# Patient Record
Sex: FEMALE | Race: WHITE | NOT HISPANIC OR LATINO | Employment: OTHER | URBAN - METROPOLITAN AREA
[De-identification: names, ages, dates, MRNs, and addresses within clinical notes are randomized per-mention and may not be internally consistent; named-entity substitution may affect disease eponyms.]

---

## 2017-03-02 ENCOUNTER — OFFICE VISIT (OUTPATIENT)
Dept: FAMILY MEDICINE | Facility: CLINIC | Age: 62
End: 2017-03-02

## 2017-03-02 VITALS
SYSTOLIC BLOOD PRESSURE: 130 MMHG | WEIGHT: 210.2 LBS | DIASTOLIC BLOOD PRESSURE: 88 MMHG | TEMPERATURE: 98.4 F | HEART RATE: 77 BPM | BODY MASS INDEX: 31.96 KG/M2 | OXYGEN SATURATION: 98 %

## 2017-03-02 DIAGNOSIS — G43.709 CHRONIC MIGRAINE WITHOUT AURA WITHOUT STATUS MIGRAINOSUS, NOT INTRACTABLE: ICD-10-CM

## 2017-03-02 DIAGNOSIS — J01.01 ACUTE RECURRENT MAXILLARY SINUSITIS: Primary | ICD-10-CM

## 2017-03-02 PROCEDURE — 99213 OFFICE O/P EST LOW 20 MIN: CPT | Performed by: PHYSICIAN ASSISTANT

## 2017-03-02 RX ORDER — KETOROLAC TROMETHAMINE 30 MG/ML
60 INJECTION, SOLUTION INTRAMUSCULAR; INTRAVENOUS ONCE
Qty: 2 ML | Refills: 0 | OUTPATIENT
Start: 2017-03-02 | End: 2017-03-02

## 2017-03-02 RX ORDER — BENZONATATE 100 MG/1
100 CAPSULE ORAL 3 TIMES DAILY PRN
Qty: 30 CAPSULE | Refills: 0 | Status: SHIPPED | OUTPATIENT
Start: 2017-03-02 | End: 2017-06-06

## 2017-03-02 NOTE — MR AVS SNAPSHOT
After Visit Summary   3/2/2017    Evelyn Medrano    MRN: [id number]           Patient Information     Date Of Birth          1955        Visit Information        Provider Department      3/2/2017 6:00 PM Rosita Arriola PA-C [facility]        Today's Diagnoses     Acute recurrent maxillary sinusitis    -  1    Chronic migraine without aura without status migrainosus, not intractable           Follow-ups after your visit        Follow-up notes from your care team     Return if symptoms worsen or fail to improve.      Who to contact     If you have questions or need follow up information about today's clinic visit or your schedule please contact [facility] directly at [phone].  Normal or non-critical lab and imaging results will be communicated to you by [employer], letter or phone within 4 business days after the clinic has received the results. If you do not hear from us within 7 days, please contact the clinic through [employer] or phone. If you have a critical or abnormal lab result, we will notify you by phone as soon as possible.  Submit refill requests through [employer] or call your pharmacy and they will forward the refill request to us. Please allow 3 business days for your refill to be completed.          Additional Information About Your Visit        [employer] Information     [employer] gives you secure access to your electronic health record. If you see a primary care provider, you can also send messages to your care team and make appointments. If you have questions, please call your primary care clinic.  If you do not have a primary care provider, please call [phone] and they will assist you.        Care EveryWhere ID     This is your Care EveryWhere ID. This could be used by other organizations to access your [facility] medical records  [id number]        Your Vitals Were     Pulse Temperature Last Period Pulse Oximetry BMI (Body  Mass Index)       77 98.4  F (36.9  C) (Oral) 02/15/2003 98% 31.96 kg/m2        Blood Pressure from Last 3 Encounters:   03/02/17 130/88   11/28/16 (!) 142/92   10/03/16 120/82    Weight from Last 3 Encounters:   03/02/17 95.3 kg (210 lb 3.2 oz)   11/28/16 94.7 kg (208 lb 12.8 oz)   10/03/16 91.6 kg (202 lb)              Today, you had the following     No orders found for display         Today's Medication Changes          These changes are accurate as of: 3/2/17 10:36 PM.  If you have any questions, ask your nurse or doctor.               Start taking these medicines.        Dose/Directions    amoxicillin-clavulanate 875-125 MG per tablet   Commonly known as:  AUGMENTIN   Used for:  Acute recurrent maxillary sinusitis   Started by:  Rosita Arriola PA-C        Dose:  1 tablet   Take 1 tablet by mouth 2 times daily   Quantity:  20 tablet   Refills:  0       benzonatate 100 MG capsule   Commonly known as:  TESSALON   Used for:  Acute recurrent maxillary sinusitis   Started by:  Rosita Arriola PA-C        Dose:  100 mg   Take 1 capsule (100 mg) by mouth 3 times daily as needed   Quantity:  30 capsule   Refills:  0       ketorolac 60 MG/2ML Soln injection   Commonly known as:  TORADOL   Used for:  Chronic migraine without aura without status migrainosus, not intractable   Started by:  Rosita Arriola PA-C        Dose:  60 mg   Inject 2 mLs (60 mg) into the muscle once for 1 dose   Quantity:  2 mL   Refills:  0            Where to get your medicines      These medications were sent to [facility]/pharmacy #[id number] [place], [place] - [address], [place] [address]     Phone:  [phone]     amoxicillin-clavulanate 875-125 MG per tablet    benzonatate 100 MG capsule         Some of these will need a paper prescription and others can be bought over the counter.  Ask your nurse if you have questions.     You don't need a prescription for these medications     ketorolac 60  MG/2ML Soln injection                Primary Care Provider Office Phone # Fax #    Brandy Leonardo [phone]       [facility] [address] [facility] [address]  [facility] [address]        Thank you!     Thank you for choosing [facility]  for your care. Our goal is always to provide you with excellent care. Hearing back from our patients is one way we can continue to improve our services. Please take a few minutes to complete the written survey that you may receive in the mail after your visit with us. Thank you!             Your Updated Medication List - Protect others around you: Learn how to safely use, store and throw away your medicines at www.disposemymeds.org.          This list is accurate as of: 3/2/17 10:36 PM.  Always use your most recent med list.                   Brand Name Dispense Instructions for use    amoxicillin-clavulanate 875-125 MG per tablet    AUGMENTIN    20 tablet    Take 1 tablet by mouth 2 times daily       benzonatate 100 MG capsule    TESSALON    30 capsule    Take 1 capsule (100 mg) by mouth 3 times daily as needed       fluticasone-vilanterol 100-25 MCG/INH oral inhaler    BREO ELLIPTA    1 Inhaler    Inhale 1 puff into the lungs daily       IMITREX 6 MG/0.5ML injection   Generic drug:  SUMAtriptan      0.5 ML 1 TIME ONLY       ketorolac 60 MG/2ML Soln injection    TORADOL    2 mL    Inject 2 mLs (60 mg) into the muscle once for 1 dose       MULTIVITAMIN PO      1 qd       OMEPRAZOLE PO

## 2017-03-03 NOTE — NURSING NOTE
The following medication was given:     MEDICATION: Ketorolac Tromethamine 60MG/2ML (30 mg/mL) (Toradol)  ROUTE: IM  SITE: LUQ - Gluteus  DOSE:  2 ml  LOT #: -[id number]  :  Avni  EXPIRATION DATE:  04/01/2017  NDC#: 1429-2955-97  Patient waited 20 minutes and did fine

## 2017-03-03 NOTE — PROGRESS NOTES
CC: Sinus    History:  For the last 2 weeks, Evelyn has been having facial pain, drainage, coughing. Cough is producing mucous- no blood. Pain is triggering migraines for which she does Imitrex injections and she already did one today at 12:30 that did not work. Had some chills and body aches, but no fever. No shortness of breath, wheezing. Has been lower energy. Has been staying hydrated.     PMH, MEDICATIONS, ALLERGIES, SOCIAL AND FAMILY HISTORY in [facility] and reviewed by me personally.    ROS negative other than the symptoms noted above in the HPI.      Examination   /88 (BP Location: Right arm, Patient Position: Chair, Cuff Size: Adult Large)  Pulse 77  Temp 98.4  F (36.9  C) (Oral)  Wt 95.3 kg (210 lb 3.2 oz)  LMP 02/15/2003  SpO2 98%  BMI 31.96 kg/m2       Constitutional: Sitting comfortably, in no acute distress. Vital signs noted  Eyes: pupils equal round reactive to light and accomodation, extra ocular movements intact  Ears: external canals and TMs free of abnormalities  Nose: patent, without mucosal abnormalities. Tenderness over maxillary sinuses.  Mouth and throat: without erythema or lesions of the mucosa  Neck:  no adenopathy, trachea midline and normal to palpation  Cardiovascular:  regular rate and rhythm, no murmurs, clicks, or gallops  Respiratory:  no chest deformities noted, normal respiratory rate and rhythm  Psychiatric: mentation appears normal and affect normal/bright        A/P    ICD-10-CM    1. Acute recurrent maxillary sinusitis J01.01 amoxicillin-clavulanate (AUGMENTIN) 875-125 MG per tablet     benzonatate (TESSALON) 100 MG capsule   2. Migraine G43.909        DISCUSSION: Given duration of symptoms, recommended Evelyn complete 10 day course of Augmentin. She should take this twice daily with food, and should consider taking probiotic or eating yogurt in between. Warned her of possible side effects including loose stool.  Provided her with a handout on OTC therapies based on  symptoms.     Given that Imitrex injection was not effective in relieving migraine. Recommended Evelyn do Toradol (ketorolac) injection. Evelyn believes she had this before. No history of kidney or blood disorders. Tolerated well. Remained in clinic 20 minutes prior to leaving with some relief of migraine.     follow up visit: As needed    Rosita Arriola PA-C  [facility]

## 2017-03-03 NOTE — NURSING NOTE
Evelyn is here for sinus and chest congestion    Pre-Visit Screening :  Immunizations : up to date  Colonoscopy : is up to date  Mammogram : is up to date  Asthma Action Test/Plan : LOLA  PHQ9/GAD7 :  NA  Pulse - regular  My Chart - accepts    CLASSIFICATION OF OVERWEIGHT AND OBESITY BY BMI                         Obesity Class           BMI(kg/m2)  Underweight                                    < 18.5  Normal                                         18.5-24.9  Overweight                                     25.0-29.9  OBESITY                     I                  30.0-34.9                              II                 35.0-39.9  EXTREME OBESITY             III                >40                             Patient's  BMI Body mass index is 31.96 kg/(m^2).  http://hin.nhlbi.nih.gov/menuplanner/menu.cgi  Questioned patient about current smoking habits.  Pt. has never smoked.

## 2017-03-07 ENCOUNTER — TELEPHONE (OUTPATIENT)
Dept: FAMILY MEDICINE | Facility: CLINIC | Age: 62
End: 2017-03-07

## 2017-03-07 DIAGNOSIS — J01.01 ACUTE RECURRENT MAXILLARY SINUSITIS: Primary | ICD-10-CM

## 2017-03-07 RX ORDER — LEVOFLOXACIN 500 MG/1
500 TABLET, FILM COATED ORAL DAILY
Qty: 10 TABLET | Refills: 0 | Status: SHIPPED | OUTPATIENT
Start: 2017-03-07 | End: 2017-06-06

## 2017-03-07 NOTE — TELEPHONE ENCOUNTER
pt called the clinical support line with the following;    Pt states that her Augmenten is not working and would like another medication/antibiotic.  Pt is requesting a phone call back.  [place]  [phone] (home)

## 2017-03-07 NOTE — TELEPHONE ENCOUNTER
Still having symptoms, but maybe slightly better. Recommended she take ibuprofen on a schedule, instead of reacting to headache. Gave rx for levaquin to fill in 24-48 hours if still not feeling better. Take with food. Warned of side effects.

## 2017-06-06 ENCOUNTER — OFFICE VISIT (OUTPATIENT)
Dept: FAMILY MEDICINE | Facility: CLINIC | Age: 62
End: 2017-06-06

## 2017-06-06 VITALS
HEIGHT: 67 IN | TEMPERATURE: 98.1 F | SYSTOLIC BLOOD PRESSURE: 136 MMHG | WEIGHT: 209.4 LBS | OXYGEN SATURATION: 97 % | DIASTOLIC BLOOD PRESSURE: 80 MMHG | HEART RATE: 85 BPM | BODY MASS INDEX: 32.87 KG/M2

## 2017-06-06 DIAGNOSIS — L67.9 HAIR CHANGES: Primary | ICD-10-CM

## 2017-06-06 DIAGNOSIS — E66.811 OBESITY (BMI 30.0-34.9): ICD-10-CM

## 2017-06-06 DIAGNOSIS — M25.50 MULTIPLE JOINT PAIN: ICD-10-CM

## 2017-06-06 LAB
ERYTHROCYTE [SEDIMENTATION RATE] IN BLOOD: 9 MM/HR (ref 0–20)
HBA1C MFR BLD: 5.6 % (ref 4–7)

## 2017-06-06 PROCEDURE — 99213 OFFICE O/P EST LOW 20 MIN: CPT | Performed by: PHYSICIAN ASSISTANT

## 2017-06-06 PROCEDURE — 85651 RBC SED RATE NONAUTOMATED: CPT | Performed by: PHYSICIAN ASSISTANT

## 2017-06-06 PROCEDURE — 84443 ASSAY THYROID STIM HORMONE: CPT | Mod: 90 | Performed by: PHYSICIAN ASSISTANT

## 2017-06-06 PROCEDURE — 86039 ANTINUCLEAR ANTIBODIES (ANA): CPT | Mod: 90 | Performed by: PHYSICIAN ASSISTANT

## 2017-06-06 PROCEDURE — 83036 HEMOGLOBIN GLYCOSYLATED A1C: CPT | Performed by: PHYSICIAN ASSISTANT

## 2017-06-06 PROCEDURE — 86431 RHEUMATOID FACTOR QUANT: CPT | Mod: 90 | Performed by: PHYSICIAN ASSISTANT

## 2017-06-06 PROCEDURE — 36415 COLL VENOUS BLD VENIPUNCTURE: CPT | Performed by: PHYSICIAN ASSISTANT

## 2017-06-06 PROCEDURE — 86038 ANTINUCLEAR ANTIBODIES: CPT | Mod: 90 | Performed by: PHYSICIAN ASSISTANT

## 2017-06-06 NOTE — MR AVS SNAPSHOT
"              After Visit Summary   6/6/2017    Evelyn Medrano    MRN: [id number]           Patient Information     Date Of Birth          1955        Visit Information        Provider Department      6/6/2017 12:45 PM Shikha Salazar PA [facility]        Today's Diagnoses     Hair changes    -  1    Obesity (BMI 30.0-34.9)        Multiple joint pain           Follow-ups after your visit        Who to contact     If you have questions or need follow up information about today's clinic visit or your schedule please contact [facility] directly at [phone].  Normal or non-critical lab and imaging results will be communicated to you by MyChart, letter or phone within 4 business days after the clinic has received the results. If you do not hear from us within 7 days, please contact the clinic through [employer] or phone. If you have a critical or abnormal lab result, we will notify you by phone as soon as possible.  Submit refill requests through [employer] or call your pharmacy and they will forward the refill request to us. Please allow 3 business days for your refill to be completed.          Additional Information About Your Visit        MyChart Information     [employer] gives you secure access to your electronic health record. If you see a primary care provider, you can also send messages to your care team and make appointments. If you have questions, please call your primary care clinic.  If you do not have a primary care provider, please call [phone] and they will assist you.        Care EveryWhere ID     This is your Care EveryWhere ID. This could be used by other organizations to access your [facility] medical records  [id number]        Your Vitals Were     Pulse Temperature Height Last Period Pulse Oximetry Breastfeeding?    85 98.1  F (36.7  C) (Oral) 1.689 m (5' 6.5\") 02/15/2003 97% No    BMI (Body Mass Index)                   33.29 kg/m2  "           Blood Pressure from Last 3 Encounters:   06/06/17 136/80   03/02/17 130/88   11/28/16 (!) 142/92    Weight from Last 3 Encounters:   06/06/17 95 kg (209 lb 6.4 oz)   03/02/17 95.3 kg (210 lb 3.2 oz)   11/28/16 94.7 kg (208 lb 12.8 oz)              We Performed the Following     Antinuclear antibody (BRAYDEN) (QUEST)     ESR, WESTERGREN (BFP)     Hemoglobin A1c (BFP)     RHEUMATOID FACTOR (QUEST)     TSH with free T4 reflex (QUEST)     VENOUS COLLECTION        Primary Care Provider Office Phone # Fax #    Brandy Leonardo [phone]       [facility] [address] [facility] [address]  [facility] [address]        Thank you!     Thank you for choosing [facility]  for your care. Our goal is always to provide you with excellent care. Hearing back from our patients is one way we can continue to improve our services. Please take a few minutes to complete the written survey that you may receive in the mail after your visit with us. Thank you!             Your Updated Medication List - Protect others around you: Learn how to safely use, store and throw away your medicines at www.disposemymeds.org.          This list is accurate as of: 6/6/17  2:08 PM.  Always use your most recent med list.                   Brand Name Dispense Instructions for use    IMITREX 6 MG/0.5ML injection   Generic drug:  SUMAtriptan      0.5 ML 1 TIME ONLY       MULTIVITAMIN PO      1 qd

## 2017-06-06 NOTE — NURSING NOTE
Evelyn is here to check thyroid    Pre-Visit Screening :  Immunizations : up to date  Colonoscopy : is up to date  Mammogram : is up to date  Asthma Action Test/Plan : NA  PHQ9/GAD7 :  NA  Pulse - regular  My Chart - accepts    CLASSIFICATION OF OVERWEIGHT AND OBESITY BY BMI                         Obesity Class           BMI(kg/m2)  Underweight                                    < 18.5  Normal                                         18.5-24.9  Overweight                                     25.0-29.9  OBESITY                     I                  30.0-34.9                              II                 35.0-39.9  EXTREME OBESITY             III                >40                             Patient's  BMI Body mass index is 33.29 kg/(m^2).  http://hin.nhlbi.nih.gov/menuplanner/menu.cgi  Questioned patient about current smoking habits.  Pt. has never smoked.

## 2017-06-06 NOTE — PROGRESS NOTES
SUBJECTIVE:                                                    Evelyn Medrano is a 61 year old female who presents to clinic today for the following health issues:      Patient is here with request for thyroid check.    Her hair has become very dry, for the last month - worsening in the last month. Patient does color her hair.  Has googled what could cause the changes to her hair and she would like her thyroid checked.  No new shampoos, coloring etc. Has been using conditioning treatments and it hasn't helped. Has noticed some hair loss.     Has had cough on/off since October.   Non-smoker, no chemical exposures.  Cough comes and goes.   She denies any SOB.     She also states that for > 3 months she has had multiple joint pain in shoulders, knees and hands.  There is no family hx of RA that she knows of, mother did have systemic scleroderma.       ROS:   C: NEGATIVE for fever, chills, change in weight  E: NEGATIVE for vision changes or irritation  E/M: NEGATIVE for ear, mouth and throat problems  R: NEGATIVE for significant cough or SOB  CV: NEGATIVE for chest pain, palpitations or peripheral edema  GI: NEGATIVE for nausea, abdominal pain, heartburn, or change in bowel habits  -does have intermittent constipation.  : NEGATIVE for frequency, dysuria, or hematuria  Musc: has bilateral knee and shoulder pain, intermittent - comes and goes.      Walking 4 days week.         Labs reviewed in EPIC  BP Readings from Last 3 Encounters:   06/06/17 136/80   03/02/17 130/88   11/28/16 (!) 142/92    Wt Readings from Last 3 Encounters:   06/06/17 95 kg (209 lb 6.4 oz)   03/02/17 95.3 kg (210 lb 3.2 oz)   11/28/16 94.7 kg (208 lb 12.8 oz)                  Patient Active Problem List   Diagnosis     Migraine     Diffuse cystic mastopathy     Choroidal Melanoma     Anxiety state     ACP (advance care planning)     Microscopic hematuria     Health Care Home     Obesity (BMI 30.0-34.9)     GERD (gastroesophageal reflux  disease)     Vitamin D deficiency     Past Surgical History:   Procedure Laterality Date     C ANESTH,SURGERY OF SHOULDER      frozen shoulder     C APPENDECTOMY       C ASHLEE RETINAL VERONICARADNITHYA IMPLNT      [place], followed at [place], also [facility]ATON TOTAL KNEE ARTHROPLASTY      meniscus tear     COLONOSCOPY      Normal repeat in 5 years     COLONOSCOPY  2013    internal hemorrhoids; repeat in 10 years     DESTRUC RETINAL VERONICA,PHOTOCOAG      right eye/ melanoma treatment     HC TOOTH EXTRACTION W/FORCEP  age 25     UPPER GI ENDOSCOPY  2011    normal       Social History   Substance Use Topics     Smoking status: Never Smoker     Smokeless tobacco: Never Used     Alcohol use 1.0 oz/week     2 Standard drinks or equivalent per week      Comment: rarely     Family History   Problem Relation Age of Onset     Neurologic Disorder Mother      migraines     Arthritis Mother 33     scleroderma - systemic     Neurologic Disorder Father       from MVA     DIABETES Brother      DIABETES Sister          Current Outpatient Prescriptions   Medication Sig Dispense Refill     IMITREX 6 MG/0.5ML SC SOLN 0.5 ML 1 TIME ONLY       MULTIVITAMIN OR 1 qd       Allergies   Allergen Reactions     No Known Allergies      Recent Labs   Lab Test  14   0914  10/11/12   0634 12/09/11   12/01/10   0600  12/01/10   0500   LDL  112   --   124   --    --   119   HDL  72   --   77   --    --   89   TRIG  110   --   72   --    --   78   ALT  24  110*  26   < >   --   23   CR  1.08*  0.90  0.92   < >   --   1.03   GFRESTIMATED  57*  65  71   < >   --   56*   GFRESTBLACK   --   79   --    --    --    --    POTASSIUM  4.5  3.4  3.9   < >   --   4.2   TSH   --    --    --    --   2.60   --     < > = values in this interval not displayed.              OBJECTIVE:   /80 (BP Location: Left arm, Patient Position: Chair, Cuff Size: Adult Large)  Pulse 85  Temp 98.1  F (36.7  C) (Oral)  Ht 1.689 m (5'  "6.5\")  Wt 95 kg (209 lb 6.4 oz)  LMP 02/15/2003  SpO2 97%  Breastfeeding? No  BMI 33.29 kg/m2   Body mass index is 33.29 kg/(m^2).       GENERAL: healthy, alert and no distress  HEAD: Normocephalic, atraumatic  EYES: Eyes grossly normal to inspection, extraocular movements - intact in all directions. No discharge  EARS: canals- normal; TMs- normal  NOSE:  Nasal mucosa pink and moist. No abnormal discharge.  MOUTH:   Mucous membranes moist.  Pharynx non-erythematous, no exudates. No ulcers, no lesions  NECK: no tenderness, no adenopathy,  no masses, no stiffness; thyroid- normal to palpation  RESP: lungs clear to auscultation - no rales, no rhonchi, no wheezes  CV: regular rates and rhythm, normal S1 S2, no S3 or S4 and no murmur, no click or rub   MS: extremities- no gross deformities noted - Full ROM of all extremities.  No [place] or [place] nodes, normal  MTP alignment left and right hands, no radial deviation.   NEURO: strength and tone- normal, sensory exam- grossly normal, mentation- intact, speech- normal  Hair: pull test negative for excessive hair loss.  The hair is dyed, very coarse.           ASSESSMENT/PLAN:                                                    1. Hair changes    - TSH with free T4 reflex (QUEST)  - VENOUS COLLECTION  Derm if TSH nl.    2. Obesity (BMI 30.0-34.9)    - VENOUS COLLECTION  - Hemoglobin A1c (BFP)    3. Multiple joint pain    - ESR, WESTERGREN (BFP)  - Antinuclear antibody (BRAYDEN) (QUEST)  - RHEUMATOID FACTOR (QUEST)  - VENOUS COLLECTION  Advised 1,000 mg APAP tid      BMI:   Estimated body mass index is 33.29 kg/(m^2) as calculated from the following:    Height as of this encounter: 1.689 m (5' 6.5\").    Weight as of this encounter: 95 kg (209 lb 6.4 oz).   Weight management plan: Discussed healthy diet and exercise guidelines and patient will follow up in 12 months in clinic to re-evaluate.              Shikha Salazar PA-C  [facility]    "

## 2017-06-07 LAB
ANA PATTERN - QUEST: ABNORMAL
ANA SCREEN - QUEST: POSITIVE
ANTINUCLEAR ANTIBODIES - QUEST: ABNORMAL TITER
RHEUMATOID FACT SER NEPH-ACNC: 13 IU/ML (ref 0–20)
TSH SERPL-ACNC: 3.76 MIU/L (ref 0.4–4.5)

## 2017-07-20 LAB
HEP C HIM: NORMAL
T4 TOTAL: 6.6 UG/DL (ref 4.5–12)
TSH SERPL-ACNC: 1.83 MCU/ML (ref 0.4–4.5)

## 2017-09-08 ENCOUNTER — HEALTH MAINTENANCE LETTER (OUTPATIENT)
Age: 62
End: 2017-09-08

## 2017-10-19 ENCOUNTER — TRANSFERRED RECORDS (OUTPATIENT)
Dept: FAMILY MEDICINE | Facility: CLINIC | Age: 62
End: 2017-10-19

## 2017-11-29 ENCOUNTER — OFFICE VISIT (OUTPATIENT)
Dept: FAMILY MEDICINE | Facility: CLINIC | Age: 62
End: 2017-11-29

## 2017-11-29 VITALS
TEMPERATURE: 97.5 F | RESPIRATION RATE: 20 BRPM | HEIGHT: 67 IN | HEART RATE: 68 BPM | BODY MASS INDEX: 35.06 KG/M2 | WEIGHT: 223.4 LBS | SYSTOLIC BLOOD PRESSURE: 142 MMHG | DIASTOLIC BLOOD PRESSURE: 70 MMHG

## 2017-11-29 DIAGNOSIS — G43.009 MIGRAINE WITHOUT AURA AND WITHOUT STATUS MIGRAINOSUS, NOT INTRACTABLE: ICD-10-CM

## 2017-11-29 DIAGNOSIS — Z71.89 ACP (ADVANCE CARE PLANNING): ICD-10-CM

## 2017-11-29 DIAGNOSIS — E55.9 VITAMIN D DEFICIENCY: ICD-10-CM

## 2017-11-29 DIAGNOSIS — Z01.818 PRE-OP EXAM: Primary | ICD-10-CM

## 2017-11-29 DIAGNOSIS — E66.9 OBESITY (BMI 30-39.9): ICD-10-CM

## 2017-11-29 DIAGNOSIS — R03.0 ELEVATED BLOOD PRESSURE READING WITHOUT DIAGNOSIS OF HYPERTENSION: ICD-10-CM

## 2017-11-29 DIAGNOSIS — S83.206A TEAR OF MENISCUS OF RIGHT KNEE AS CURRENT INJURY, UNSPECIFIED MENISCUS, UNSPECIFIED TEAR TYPE, INITIAL ENCOUNTER: ICD-10-CM

## 2017-11-29 LAB — HEMOGLOBIN: 14.8 G/DL (ref 11.7–15.7)

## 2017-11-29 PROCEDURE — 99214 OFFICE O/P EST MOD 30 MIN: CPT | Performed by: PHYSICIAN ASSISTANT

## 2017-11-29 PROCEDURE — 93000 ELECTROCARDIOGRAM COMPLETE: CPT | Performed by: PHYSICIAN ASSISTANT

## 2017-11-29 PROCEDURE — 85018 HEMOGLOBIN: CPT | Performed by: PHYSICIAN ASSISTANT

## 2017-11-29 PROCEDURE — 36415 COLL VENOUS BLD VENIPUNCTURE: CPT | Performed by: PHYSICIAN ASSISTANT

## 2017-11-29 NOTE — NURSING NOTE
Evelyn Medrano is here for a pre-op exam.    Questioned patient about current smoking habits.  Pt. has never smoked.  PULSE regular  My Chart: active  CLASSIFICATION OF OVERWEIGHT AND OBESITY BY BMI                        Obesity Class           BMI(kg/m2)  Underweight                                    < 18.5  Normal                                         18.5-24.9  Overweight                                     25.0-29.9  OBESITY                     I                  30.0-34.9                             II                 35.0-39.9  EXTREME OBESITY             III                >40                            Patient's  BMI Body mass index is 35.52 kg/(m^2).  http://hin.nhlbi.nih.gov/menuplanner/menu.cgi  Pre-visit planning  Immunizations - up to date  Colonoscopy - is up to date  Mammogram - is up to date  Asthma -   PHQ9 -    ANGELIA-7 -

## 2017-11-29 NOTE — MR AVS SNAPSHOT
After Visit Summary   11/29/2017    Evelyn Medrano    MRN: [id number]           Patient Information     Date Of Birth          1955        Visit Information        Provider Department      11/29/2017 1:00 PM Shikha Salazar PA [facility]        Today's Diagnoses     Pre-op exam    -  1    ACP (advance care planning)        Tear of meniscus of right knee as current injury, unspecified meniscus, unspecified tear type, initial encounter        Migraine without aura and without status migrainosus, not intractable        Vitamin D deficiency        Obesity (BMI 30-39.9)        Elevated blood pressure reading without diagnosis of hypertension           Follow-ups after your visit        Who to contact     If you have questions or need follow up information about today's clinic visit or your schedule please contact [facility] directly at [phone].  Normal or non-critical lab and imaging results will be communicated to you by MyChart, letter or phone within 4 business days after the clinic has received the results. If you do not hear from us within 7 days, please contact the clinic through [employer] or phone. If you have a critical or abnormal lab result, we will notify you by phone as soon as possible.  Submit refill requests through [facility] or call your pharmacy and they will forward the refill request to us. Please allow 3 business days for your refill to be completed.          Additional Information About Your Visit        MyChart Information     [facility] gives you secure access to your electronic health record. If you see a primary care provider, you can also send messages to your care team and make appointments. If you have questions, please call your primary care clinic.  If you do not have a primary care provider, please call [phone] and they will assist you.        Care EveryWhere ID     This is your Care EveryWhere ID. This  "could be used by other organizations to access your [facility] medical records  [id number]        Your Vitals Were     Pulse Temperature Respirations Height Last Period BMI (Body Mass Index)    68 97.5  F (36.4  C) (Oral) 20 1.689 m (5' 6.5\") 02/15/2003 35.52 kg/m2       Blood Pressure from Last 3 Encounters:   11/29/17 142/70   06/06/17 136/80   03/02/17 130/88    Weight from Last 3 Encounters:   11/29/17 101.3 kg (223 lb 6.4 oz)   06/06/17 95 kg (209 lb 6.4 oz)   03/02/17 95.3 kg (210 lb 3.2 oz)              We Performed the Following     CL AFF HEMOGLOBIN (BFP)     EKG 12-lead complete w/read - Clinics     VENOUS COLLECTION        Primary Care Provider Office Phone # Fax #    Brandy Leonardo [phone] [address] [address]        Equal Access to Services     [facility]: Hadii carl ku hadasho Soomaali, waaxda luqadaha, qaybta kaalmada adeegyagilberto, john neely . So [facility] [phone].    ATENCIÓN: Si habla español, tiene a silva disposición servicios gratuitos de asistencia lingüística. Llame al [phone].    We comply with applicable federal civil rights laws and [place] laws. We do not discriminate on the basis of race, color, national origin, age, disability, sex, sexual orientation, or gender identity.            Thank you!     Thank you for choosing [facility]  for your care. Our goal is always to provide you with excellent care. Hearing back from our patients is one way we can continue to improve our services. Please take a few minutes to complete the written survey that you may receive in the mail after your visit with us. Thank you!             Your Updated Medication List - Protect others around you: Learn how to safely use, store and throw away your medicines at www.disposemymeds.org.          This list is accurate as of: 11/29/17  1:51 PM.  Always use your most recent med list.                   " Brand Name Dispense Instructions for use Diagnosis    IMITREX 6 MG/0.5ML injection   Generic drug:  SUMAtriptan      0.5 ML 1 TIME ONLY        MULTIVITAMIN PO      1 qd

## 2017-11-29 NOTE — PROGRESS NOTES
[facility]  [address]  [address]  [facility] [address]  [phone]  Dept: [phone]    PRE-OP EVALUATION:  Today's date: 2017    Evelyn Medrano (: 1955) presents for pre-operative evaluation assessment as requested by Dr. Arredondo.  She requires evaluation and anesthesia risk assessment prior to undergoing surgery/procedure for treatment of right knee .  Proposed procedure: meniscus    Date of Surgery/ Procedure: 17  Time of Surgery/ Procedure: 9am  Hospital/Surgical Facility: [facility]  Fax number for surgical facility: [phone]  Primary Physician: Brandy Leonardo  Type of Anesthesia Anticipated: to be determined    Patient has a Health Care Directive or Living Will:  YES     1. NO - Do you have a history of heart attack, stroke, stent, bypass or surgery on an artery in the head, neck, heart or legs?  2. NO - Do you ever have any pain or discomfort in your chest?  3. NO - Do you have a history of  Heart Failure?  4. NO - Are you troubled by shortness of breath when: walking on the level, up a slight hill or at night?  5. NO - Do you currently have a cold, bronchitis or other respiratory infection?  6. NO - Do you have a cough, shortness of breath or wheezing?  7. NO - Do you sometimes get pains in the calves of your legs when you walk?  8. NO - Do you or anyone in your family have previous history of blood clots?  9. NO - Do you or does anyone in your family have a serious bleeding problem such as prolonged bleeding following surgeries or cuts?  10. NO - Have you ever had problems with anemia or been told to take iron pills?  11. NO - Have you had any abnormal blood loss such as black, tarry or bloody stools, or abnormal vaginal bleeding?  12. NO - Have you ever had a blood transfusion?  13. NO - Have you or any of your relatives ever had problems with anesthesia?  14. NO - Do you have sleep apnea, excessive snoring or daytime  drowsiness?  15. NO - Do you have any prosthetic heart valves?  16. NO - Do you have prosthetic joints?  17. NO - Is there any chance that you may be pregnant?        HPI:                                                      Brief HPI related to upcoming procedure:   Right knee pain started after chasing grandson 6 weeks ago  MRI showed meniscal tear  Cortisone shots wore off.       See problem list for active medical problems.  Problems all longstanding and stable, except as noted/documented.  See ROS for pertinent symptoms related to these conditions.                                                                                                  .    MEDICAL HISTORY:                                                    Patient Active Problem List    Diagnosis Date Noted     Choroidal Melanoma 09/16/2002     Priority: High     Follwwed by LYDIA Medeiros at [place]    Initially treated with TTT X3, and then proton beam radiation in April 2006       Obesity (BMI 30-39.9) 11/29/2017     Priority: Medium     GERD (gastroesophageal reflux disease) 12/06/2014     Priority: Medium     Health Care Home 02/22/2013     Priority: Medium     State Tier Level:  Tier 0  Status:  n/a  Care Coordinator:  n/a     See Letters for [facility] Care Plan           Vitamin D deficiency 09/02/2011     Priority: Medium     ACP (advance care planning) 09/30/2010     Priority: Medium     Advance Care Planning 11/29/2017: ACP Review of Chart / Resources Provided:  Reviewed chart for advance care plan.  Evelyn TODD Mitchell has no plan or code status on file however states presence of ACP document. Copy requested.   Added by Netta Pederson                   Diffuse cystic mastopathy 08/14/2001     Priority: Medium     Migraine without aura and without status migrainosus, not intractable 03/22/2000     Priority: Medium      Past Medical History:   Diagnosis Date     Choroidal Melanoma 9/16/2002    Follwwed by LYDIA Medeiros at [place]   Initially treated with TTT  "X3, and then proton beam radiation in April 2006     Variants of migraine, not elsewhere classified      Past Surgical History:   Procedure Laterality Date     C ANESTH,SURGERY OF SHOULDER  2005    frozen shoulder     C APPENDECTOMY  1977     C DESTRUC RETINAL JACOB MARTIN  2005    Cora, followed at [place], also Ashish HEATON TOTAL KNEE ARTHROPLASTY Left 2010    meniscus tear     COLONOSCOPY      Normal repeat in 5 years     COLONOSCOPY  2/2013    internal hemorrhoids; repeat in 10 years     DESTRUC RETINAL VERONICA,PHOTOCOAG  2003    right eye/ melanoma treatment     HC TOOTH EXTRACTION W/FORCEP  age 25     UPPER GI ENDOSCOPY  12/2011    normal     Current Outpatient Prescriptions   Medication Sig Dispense Refill     IMITREX 6 MG/0.5ML SC SOLN 0.5 ML 1 TIME ONLY       MULTIVITAMIN OR 1 qd       OTC products: Motrin daily    Allergies   Allergen Reactions     No Known Allergies       Latex Allergy: NO    Social History   Substance Use Topics     Smoking status: Never Smoker     Smokeless tobacco: Never Used     Alcohol use 1.0 oz/week     2 Standard drinks or equivalent per week      Comment: rarely     History   Drug Use No       REVIEW OF SYSTEMS:                                                    Constitutional, neuro, ENT, endocrine, pulmonary, cardiac, gastrointestinal, genitourinary, musculoskeletal, integument and psychiatric systems are negative, except as otherwise noted.      EXAM:                                                    /70 (BP Location: Right arm, Patient Position: Chair, Cuff Size: Adult Large)  Pulse 68  Temp 97.5  F (36.4  C) (Oral)  Resp 20  Ht 1.689 m (5' 6.5\")  Wt 101.3 kg (223 lb 6.4 oz)  LMP 02/15/2003  BMI 35.52 kg/m2    GENERAL APPEARANCE: healthy, alert and no distress     EYES: EOMI, PERRL     HENT: ear canals and TM's normal and nose and mouth without ulcers or lesions     NECK: no adenopathy, no asymmetry, masses, or scars and thyroid normal to " palpation     RESP: lungs clear to auscultation - no rales, rhonchi or wheezes     CV: regular rates and rhythm, normal S1 S2, no S3 or S4 and no murmur, click or rub     ABDOMEN:  soft, nontender, no HSM or masses and bowel sounds normal     MS: extremities normal- no gross deformities noted aside from right knee     SKIN: no suspicious lesions or rashes     NEURO: Normal strength and tone, sensory exam grossly normal, mentation intact and speech normal     PSYCH: mentation appears normal. and affect normal/bright    DIAGNOSTICS:                                                      EKG: appears normal, NSR, normal axis, normal intervals, no acute ST/T changes c/w ischemia, no LVH by voltage criteria  Labs Resulted Today:   Results for orders placed or performed in visit on 11/29/17   CL AFF HEMOGLOBIN (BFP)   Result Value Ref Range    Hemoglobin 14.8 11.7 - 15.7 g/dL       Recent Labs   Lab Test  06/06/17   1332  03/14/14   0914  02/22/13   1132  10/11/12   0634   HGB   --    --   16.0  16.2*   PLT   --    --   231  228   NA   --   141   --   138   POTASSIUM   --   4.5   --   3.4   CR   --   1.08*   --   0.90   A1C  5.6   --    --    --         IMPRESSION:                                                    Reason for surgery/procedure: right meniscus tear  Diagnosis/reason for consult:  Preoperative clearance      The proposed surgical procedure is considered INTERMEDIATE risk.    REVISED CARDIAC RISK INDEX  The patient has the following serious cardiovascular risks for perioperative complications such as (MI, PE, VFib and 3  AV Block):  No serious cardiac risks  INTERPRETATION: 0 risks: Class I (very low risk - 0.4% complication rate)    The patient has the following additional risks for perioperative complications:  No identified additional risks      ICD-10-CM    1. Pre-op exam Z01.818 CL AFF HEMOGLOBIN (BFP)     VENOUS COLLECTION     EKG 12-lead complete w/read - Clinics   2. ACP (advance care planning) Z71.89     3. Tear of meniscus of right knee as current injury, unspecified meniscus, unspecified tear type, initial encounter S83.206A    4. Migraine without aura and without status migrainosus, not intractable G43.009    5. Vitamin D deficiency E55.9    6. Obesity (BMI 30-39.9) E66.9    7. Elevated blood pressure reading without diagnosis of hypertension R03.0        RECOMMENDATIONS:                                                          APPROVAL GIVEN to proceed with proposed procedure, without further diagnostic evaluation       Signed Electronically by: Shikha Salazar PA-C    Copy of this evaluation report is provided to requesting physician.    [place] Preop Guidelines

## 2017-12-11 ENCOUNTER — TRANSFERRED RECORDS (OUTPATIENT)
Dept: FAMILY MEDICINE | Facility: CLINIC | Age: 62
End: 2017-12-11

## 2017-12-19 ENCOUNTER — TRANSFERRED RECORDS (OUTPATIENT)
Dept: FAMILY MEDICINE | Facility: CLINIC | Age: 62
End: 2017-12-19

## 2017-12-20 ENCOUNTER — TRANSFERRED RECORDS (OUTPATIENT)
Dept: FAMILY MEDICINE | Facility: CLINIC | Age: 62
End: 2017-12-20

## 2018-01-29 ENCOUNTER — OFFICE VISIT (OUTPATIENT)
Dept: FAMILY MEDICINE | Facility: CLINIC | Age: 63
End: 2018-01-29

## 2018-01-29 VITALS
DIASTOLIC BLOOD PRESSURE: 89 MMHG | WEIGHT: 219.6 LBS | HEART RATE: 77 BPM | BODY MASS INDEX: 34.91 KG/M2 | OXYGEN SATURATION: 98 % | SYSTOLIC BLOOD PRESSURE: 137 MMHG | TEMPERATURE: 98 F

## 2018-01-29 DIAGNOSIS — R05.9 COUGH: Primary | ICD-10-CM

## 2018-01-29 PROCEDURE — 99213 OFFICE O/P EST LOW 20 MIN: CPT | Performed by: PHYSICIAN ASSISTANT

## 2018-01-29 NOTE — MR AVS SNAPSHOT
After Visit Summary   1/29/2018    Evelyn Medrano    MRN: [id number]           Patient Information     Date Of Birth          1955        Visit Information        Provider Department      1/29/2018 5:30 PM Shikha Salazar PA [facility]        Today's Diagnoses     Cough    -  1       Follow-ups after your visit        Who to contact     If you have questions or need follow up information about today's clinic visit or your schedule please contact [facility] directly at [phone].  Normal or non-critical lab and imaging results will be communicated to you by [employer], letter or phone within 4 business days after the clinic has received the results. If you do not hear from us within 7 days, please contact the clinic through [employer] or phone. If you have a critical or abnormal lab result, we will notify you by phone as soon as possible.  Submit refill requests through [employer] or call your pharmacy and they will forward the refill request to us. Please allow 3 business days for your refill to be completed.          Additional Information About Your Visit        MyChart Information     [employer] gives you secure access to your electronic health record. If you see a primary care provider, you can also send messages to your care team and make appointments. If you have questions, please call your primary care clinic.  If you do not have a primary care provider, please call [phone] and they will assist you.        Care EveryWhere ID     This is your Care EveryWhere ID. This could be used by other organizations to access your [facility] medical records  [id number]        Your Vitals Were     Pulse Temperature Last Period Pulse Oximetry Breastfeeding? BMI (Body Mass Index)    77 98  F (36.7  C) (Oral) 02/26/2003 98% No 34.91 kg/m2       Blood Pressure from Last 3 Encounters:   01/29/18 137/89   11/29/17 142/70   06/06/17 136/80    Weight  from Last 3 Encounters:   01/29/18 99.6 kg (219 lb 9.6 oz)   11/29/17 101.3 kg (223 lb 6.4 oz)   06/06/17 95 kg (209 lb 6.4 oz)              Today, you had the following     No orders found for display         Today's Medication Changes          These changes are accurate as of 1/29/18  6:06 PM.  If you have any questions, ask your nurse or doctor.               Start taking these medicines.        Dose/Directions    amoxicillin-clavulanate 875-125 MG per tablet   Commonly known as:  AUGMENTIN   Used for:  Cough   Started by:  Shikha Salazar PA        Dose:  1 tablet   Take 1 tablet by mouth 2 times daily   Quantity:  20 tablet   Refills:  0            Where to get your medicines      These medications were sent to [facility]/pharmacy [address] [facility] [address], [place] [address]     Phone:  [phone]     amoxicillin-clavulanate 875-125 MG per tablet                Primary Care Provider Office Phone # Fax #    Brandy Leonardo [phone] [address] [place] [address]        Equal Access to Services     [facility]: Hadii carl mark hadasho Soomaali, waaxda luqadaha, qaybta kaalmada adeegyada, john akins. So [facility] [phone].    ATENCIÓN: Si habla español, tiene a silva disposición servicios gratuitos de asistencia lingüística. [facility] [phone].    We comply with applicable federal civil rights laws and [place] laws. We do not discriminate on the basis of race, color, national origin, age, disability, sex, sexual orientation, or gender identity.            Thank you!     Thank you for choosing [facility]  for your care. Our goal is always to provide you with excellent care. Hearing back from our patients is one way we can continue to improve our services. Please take a few minutes to complete the written survey that you may receive in the mail after your visit  with us. Thank you!             Your Updated Medication List - Protect others around you: Learn how to safely use, store and throw away your medicines at www.disposemymeds.org.          This list is accurate as of 1/29/18  6:06 PM.  Always use your most recent med list.                   Brand Name Dispense Instructions for use Diagnosis    amoxicillin-clavulanate 875-125 MG per tablet    AUGMENTIN    20 tablet    Take 1 tablet by mouth 2 times daily    Cough       IMITREX 6 MG/0.5ML injection   Generic drug:  SUMAtriptan      0.5 ML 1 TIME ONLY        MULTIVITAMIN PO      1 qd

## 2018-01-29 NOTE — NURSING NOTE
Evelyn is here for a cough and congestion that she has had for one week, pt states she cant sleep at night, and pt states she is going out of town so she was hoping to get something to help before then    Pre-Visit Screening :  Immunizations : up to date    Colonoscopy : is up to date  Mammogram : is up to date  Asthma Action Test/Plan : na  PHQ9/GAD7 :  Na    Pulse - regular  My Chart - accepts    CLASSIFICATION OF OVERWEIGHT AND OBESITY BY BMI                         Obesity Class           BMI(kg/m2)  Underweight                                    < 18.5  Normal                                         18.5-24.9  Overweight                                     25.0-29.9  OBESITY                     I                  30.0-34.9                              II                 35.0-39.9  EXTREME OBESITY             III                >40                             Patient's  BMI Body mass index is 22.15 kg/(m^2).  http://hin.nhlbi.nih.gov/menuplanner/menu.cgi  Questioned patient about current smoking habits.  Pt. has never smoked.  The patient has verbalized that it is ok to leave a detailed voice message on the patient's cell phone with results/recommendations from this visit.       Verified [phone] phone number:

## 2018-03-17 NOTE — PROGRESS NOTES
Central Line Procedure


REASON FOR PROCEDURE


Central venous access





PROCEDURE PERFORMED


Central line placement: Left IJ CVL





CONSENT


Informed consent for procedure was not obtained and considered emergent due to 

hemodynamic instability.  The risks and benefits of the procedure were 

discussed with RN at bedside to include but limited to bleeding, clot formation

, infection, and even death.





ANESTHESIA


Local injection of 1% Lidocaine





DESCRIPTION OF THE PROCEDURE


The patient was placed in supine, mild Trendelenburg position.  The area was 

exposed and cleansed with ChloraPrep, times two.  Large sterile drape was used 

to cover the patient, with the site exposed, under sterile conditions including 

cap, face mask, sterile gown, and sterile gloves.  On single attempt, the 

introducer needle was inserted with negative pressure in syringe and venous 

flash was obtained.  The guide wire was then advanced without any restriction 

and the needle was removed.  The dilator was used without any complications.  

Using Seldinger technique the antibiotic coated triple lumen catheter was 

advanced over the guide wire to a depth of 20 centimeters.  The guide wire was 

removed.  All ports were aspirated with dark venous blood return and flushed 

easily with sterile saline.  All ports were capped.  Antibiotic disc was placed 

around central line at puncture site.  The central line was secured to the skin 

with two interrupted 2.0 silk sutures.  The area was bandaged with sterile see-

through central line bandage.





RADIOLOGICAL DATA


Ultrasound guidance was used to locate left internal jugular vein.  Doppler/

color flow was used to confirm venous flow.





COMPLICATIONS:


No apparent complications





ESTIMATED BLOOD LOSS:


Less than 1 cc.











Avel Moya MD Mar 17, 2018 16:50 SUBJECTIVE:                                                    Evelyn Medrano is a 62 year old female who presents to clinic today for the following health issues:    URI sx for 10 days, cough is worst sx.  has had cough since Dec, now on Levaquin and improving.        OTC: Tessalon Pearls  Robitussin AC  Honey  Robisusin      ROS:  C: NEGATIVE for fever, chills, change in weight  Eyes: NEGATIVE for vision changes or irritation  Ears: Left ear pain, no ringing  Nose: congestion  Mouth:  sore throat.   R: cough + SOB  CV: NEGATIVE for chest pain, palpitations or peripheral edema  GI: + nausea  : NEGATIVE for frequency, dysuria, or hematuria    Developing migraines due to cough.         BP Readings from Last 3 Encounters:   01/29/18 137/89   11/29/17 142/70   06/06/17 136/80    Wt Readings from Last 3 Encounters:   01/29/18 99.6 kg (219 lb 9.6 oz)   11/29/17 101.3 kg (223 lb 6.4 oz)   06/06/17 95 kg (209 lb 6.4 oz)            Patient Active Problem List   Diagnosis     Migraine without aura and without status migrainosus, not intractable     Diffuse cystic mastopathy     Choroidal Melanoma     ACP (advance care planning)     Health Care Home     GERD (gastroesophageal reflux disease)     Vitamin D deficiency     Obesity (BMI 30-39.9)     Past Surgical History:   Procedure Laterality Date     C ANESTH,SURGERY OF SHOULDER  2005    frozen shoulder     C APPENDECTOMY  1977     C DESTRUC RETINAL JACOB MARTIN IMPLNT  2005    [place], followed at [facility], also Ashish HEATON TOTAL KNEE ARTHROPLASTY Left 2010    meniscus tear     COLONOSCOPY      Normal repeat in 5 years     COLONOSCOPY  2/2013    internal hemorrhoids; repeat in 10 years     DESTRUC RETINAL VERONICA,PHOTOCOAG  2003    right eye/ melanoma treatment     HC TOOTH EXTRACTION W/FORCEP  age 25     UPPER GI ENDOSCOPY  12/2011    normal       Social History   Substance Use Topics     Smoking status: Never Smoker     Smokeless tobacco: Never Used      Alcohol use 1.0 oz/week     2 Standard drinks or equivalent per week      Comment: rarely     Family History   Problem Relation Age of Onset     Neurologic Disorder Mother      migraines     Arthritis Mother 33     scleroderma - systemic     Neurologic Disorder Father       from MVA     DIABETES Brother      DIABETES Sister          Current Outpatient Prescriptions   Medication Sig Dispense Refill     amoxicillin-clavulanate (AUGMENTIN) 875-125 MG per tablet Take 1 tablet by mouth 2 times daily 20 tablet 0     IMITREX 6 MG/0.5ML SC SOLN 0.5 ML 1 TIME ONLY       MULTIVITAMIN OR 1 qd         Allergies   Allergen Reactions     No Known Allergies        OBJECTIVE:                                                    /89 (BP Location: Left arm, Patient Position: Chair, Cuff Size: Adult Large)  Pulse 77  Temp 98  F (36.7  C) (Oral)  Wt 99.6 kg (219 lb 9.6 oz)  LMP 2003  SpO2 98%  Breastfeeding? No  BMI 34.91 kg/m2 Body mass index is 34.91 kg/(m^2).     GENERAL: alert and no distress  HEAD: Normocephalic, atraumatic  EYES: Eyes grossly normal to inspection, extraocular movements - intact  EARS:   Right: External ear and canal normal, TM normal  Left: External ear and canal normal, TM normal  NOSE: No discharge noted  MOUTH/THROAT: no ulcers, no lesions, pharynx non-erythematous, no exudates present, tonsils without hypertrophy, mucous membranes moist.   NECK: no tenderness, no adenopathy, no asymmetry, no masses, no stiffness; thyroid- normal to palpation  RESP: lungs clear to auscultation - no crackles, no rhonchi, no wheezes  CV: regular rates and rhythm, normal S1 S2, no S3 or S4 and no murmur, no click or rub -         ASSESSMENT/PLAN:                                                        ICD-10-CM    1. Cough R05 amoxicillin-clavulanate (AUGMENTIN) 875-125 MG per tablet     Symptomatic therapy suggested:  Delysm bid for cough, increase rest and fluids, OTC acetaminophen or ibuprofen.   Cough  drops prn, Vicks.  Augmentin to cover for sinus infection and cough  Probiotic encouraged  Xray on Thurs/Fri if not improving  Call or return to clinic prn if these symptoms worsen or fail to improve as anticipated.      Shikha Salazar PA-C  [facility]

## 2018-09-14 ENCOUNTER — HEALTH MAINTENANCE LETTER (OUTPATIENT)
Age: 63
End: 2018-09-14

## 2018-10-15 ENCOUNTER — TRANSFERRED RECORDS (OUTPATIENT)
Dept: FAMILY MEDICINE | Facility: CLINIC | Age: 63
End: 2018-10-15

## 2018-11-17 ENCOUNTER — OFFICE VISIT (OUTPATIENT)
Dept: FAMILY MEDICINE | Facility: CLINIC | Age: 63
End: 2018-11-17

## 2018-11-17 VITALS
HEART RATE: 78 BPM | OXYGEN SATURATION: 93 % | TEMPERATURE: 98.1 F | DIASTOLIC BLOOD PRESSURE: 80 MMHG | BODY MASS INDEX: 35.61 KG/M2 | WEIGHT: 224 LBS | SYSTOLIC BLOOD PRESSURE: 136 MMHG

## 2018-11-17 DIAGNOSIS — J20.9 ACUTE BRONCHITIS, UNSPECIFIED ORGANISM: Primary | ICD-10-CM

## 2018-11-17 DIAGNOSIS — H61.23 BILATERAL IMPACTED CERUMEN: ICD-10-CM

## 2018-11-17 DIAGNOSIS — E66.01 MORBID OBESITY (H): ICD-10-CM

## 2018-11-17 DIAGNOSIS — H93.13 TINNITUS, BILATERAL: ICD-10-CM

## 2018-11-17 PROCEDURE — 99213 OFFICE O/P EST LOW 20 MIN: CPT | Performed by: PHYSICIAN ASSISTANT

## 2018-11-17 RX ORDER — AZITHROMYCIN 250 MG/1
TABLET, FILM COATED ORAL
Qty: 6 TABLET | Refills: 0 | Status: SHIPPED | OUTPATIENT
Start: 2018-11-17 | End: 2018-12-20

## 2018-11-17 NOTE — PROGRESS NOTES
"CC: Cough    History:  Evelyn has been having intermittent cough every couple months since she was here January 2018. Now in the last 1-2 months has been worse.  Coughs so hard she gets a headache. Usually just a \"naggy\" dry cough, but now more recently is getting productive, with occasional nasal drainage, ears ringing, and nasal congestion. Does notice some wheezing when she lays down at night, but there mildly during the day as well. No fever, sweats, chills, unexplained weight changes.    Evelyn does have known GERD for which she take prilosec every morning. She also has hx of cerumen impaction where she does home rinses.     PMH, MEDICATIONS, ALLERGIES, SOCIAL AND FAMILY HISTORY in EPIC and reviewed by me personally.      ROS negative other than the symptoms noted above in the HPI.        Examination   /80 (BP Location: Right arm, Patient Position: Sitting, Cuff Size: Adult Large)  Pulse 78  Temp 98.1  F (36.7  C) (Oral)  Wt 101.6 kg (224 lb)  LMP 02/26/2003  SpO2 93%  BMI 35.61 kg/m2       Constitutional: Sitting comfortably, in no acute distress. Vital signs noted  Eyes: pupils equal round reactive to light and accomodation, extra ocular movements intact  Ears: Bilateral cerumen impaction, but visualized TMs without abnormality.   Nose: patent, without mucosal abnormalities  Mouth and throat: without erythema or lesions of the mucosa  Neck:  no adenopathy, trachea midline and normal to palpation  Cardiovascular:  regular rate and rhythm, no murmurs, clicks, or gallops  Respiratory:  normal respiratory rate and rhythm, lungs clear to auscultation  SKIN: No jaundice/pallor/rash.   Psychiatric: mentation appears normal and affect normal/bright        A/P    ICD-10-CM    1. Acute bronchitis, unspecified organism J20.9 azithromycin (ZITHROMAX) 250 MG tablet   2. Morbid obesity (H) E66.01    3. Bilateral impacted cerumen H61.23 Removal Impacted Cerumen Irrigation Unilateral (Ear Wash)   4. Tinnitus, " bilateral H93.13        DISCUSSION:  Evelyn's recently symptoms most consistent with acute bronchitis. Given duration of symptoms, may be bacterial. Recommended she complete a z-pack (azithromycin). Take with food. Warned of side effects including upset stomach, loose stool, rash, and to stop medication and contact me if side effects noted. Okay to continue using OTC cough suppressants as needed.     Ears successfully disimpacted. Gave Handout on ear wax management. Contact me if tinnitus not resolving.     For GERD, continued to encourage healthy diet and exercise with goal of weight loss. Also considering chronic cough, recommended she take omeprazole 30-60 minutes before largest meal of the day. Once she has been on this for closer to 5 years, may want to do trial of OTC ranitidine/Zantac.     Do trial of Flonase as well to try to help with intermittent/chronic cough.    follow up visit: As needed    Rosita Arriola PA-C  [facility]

## 2018-11-17 NOTE — NURSING NOTE
Evelyn is here for severe cough for a month or more, some sinus congestion            Pre-visit Screening:  Immunizations:  up to date  Colonoscopy:  is up to date  Mammogram: is up to date  Asthma Action Test/Plan:  NA  PHQ9:  none  GAD7:  none  Questioned patient about current smoking habits Pt. has never smoked.  Ok to leave detailed message on voice mail for today's visit only Yes, phone # [phone]

## 2018-11-17 NOTE — MR AVS SNAPSHOT
After Visit Summary   11/17/2018    Evelyn Medrano    MRN: [id number]           Patient Information     Date Of Birth          1955        Visit Information        Provider Department      11/17/2018 11:00 AM Rosita Arriola PA-C [facility]        Today's Diagnoses     Acute bronchitis, unspecified organism    -  1    Morbid obesity (H)        Bilateral impacted cerumen        Tinnitus, bilateral           Follow-ups after your visit        Follow-up notes from your care team     Return in about 1 year (around 11/17/2019), or if symptoms worsen or fail to improve.      Who to contact     If you have questions or need follow up information about today's clinic visit or your schedule please contact [facility] directly at [phone].  Normal or non-critical lab and imaging results will be communicated to you by MyChart, letter or phone within 4 business days after the clinic has received the results. If you do not hear from us within 7 days, please contact the clinic through Hostsp[employer]art or phone. If you have a critical or abnormal lab result, we will notify you by phone as soon as possible.  Submit refill requests through [employer] or call your pharmacy and they will forward the refill request to us. Please allow 3 business days for your refill to be completed.          Additional Information About Your Visit        MyChart Information     [employer] gives you secure access to your electronic health record. If you see a primary care provider, you can also send messages to your care team and make appointments. If you have questions, please call your primary care clinic.  If you do not have a primary care provider, please call [phone] and they will assist you.        Care EveryWhere ID     This is your Care EveryWhere ID. This could be used by other organizations to access your [place] medical records  [id number]        Your Vitals Were      Pulse Temperature Last Period Pulse Oximetry BMI (Body Mass Index)       78 98.1  F (36.7  C) (Oral) 02/26/2003 93% 35.61 kg/m2        Blood Pressure from Last 3 Encounters:   11/17/18 136/80   01/29/18 137/89   11/29/17 142/70    Weight from Last 3 Encounters:   11/17/18 101.6 kg (224 lb)   01/29/18 99.6 kg (219 lb 9.6 oz)   11/29/17 101.3 kg (223 lb 6.4 oz)              We Performed the Following     Removal Impacted Cerumen Irrigation Unilateral (Ear Wash)          Today's Medication Changes          These changes are accurate as of 11/17/18 12:33 PM.  If you have any questions, ask your nurse or doctor.               Start taking these medicines.        Dose/Directions    azithromycin 250 MG tablet   Commonly known as:  ZITHROMAX   Used for:  Acute bronchitis, unspecified organism   Started by:  Rosita Arriola PA-C        Two tablets first day, then one tablet daily for four days.   Quantity:  6 tablet   Refills:  0            Where to get your medicines      These medications were sent to [facility]/pharmacy #[id number] [facility] [address], [place] [address]     Phone:  [phone]     azithromycin 250 MG tablet                Primary Care Provider Office Phone # Fax #    Brandy Leonardo [phone] [address] [place] [address]        Equal Access to Services     [facility] AH: Hadii carl norriso Somanuel, waaxda luqadaha, qaybta kaalmada adeegyada, waxay jade pace adekristy akins. So [facility] [phone].    ATENCIÓN: Si habla español, tiene a silva disposición servicios gratuitos de asistencia lingüística. Llame al [phone].    We comply with applicable federal civil rights laws and [place] laws. We do not discriminate on the basis of race, color, national origin, age, disability, sex, sexual orientation, or gender identity.            Thank you!     Thank you for choosing [facility]   for your care. Our goal is always to provide you with excellent care. Hearing back from our patients is one way we can continue to improve our services. Please take a few minutes to complete the written survey that you may receive in the mail after your visit with us. Thank you!             Your Updated Medication List - Protect others around you: Learn how to safely use, store and throw away your medicines at www.disposemymeds.org.          This list is accurate as of 11/17/18 12:33 PM.  Always use your most recent med list.                   Brand Name Dispense Instructions for use Diagnosis    azithromycin 250 MG tablet    ZITHROMAX    6 tablet    Two tablets first day, then one tablet daily for four days.    Acute bronchitis, unspecified organism       IMITREX 6 MG/0.5ML injection   Generic drug:  SUMAtriptan      0.5 ML 1 TIME ONLY        MULTIVITAMIN PO      1 qd        omeprazole 20 MG CR capsule    priLOSEC    90 capsule    Take 1 capsule (20 mg) by mouth daily

## 2018-11-19 ENCOUNTER — TELEPHONE (OUTPATIENT)
Dept: FAMILY MEDICINE | Facility: CLINIC | Age: 63
End: 2018-11-19

## 2018-11-19 DIAGNOSIS — J20.9 ACUTE BRONCHITIS, UNSPECIFIED ORGANISM: Primary | ICD-10-CM

## 2018-11-19 RX ORDER — BENZONATATE 100 MG/1
100 CAPSULE ORAL 3 TIMES DAILY PRN
Qty: 42 CAPSULE | Refills: 0 | Status: SHIPPED | OUTPATIENT
Start: 2018-11-19 | End: 2018-12-20

## 2018-11-19 NOTE — TELEPHONE ENCOUNTER
Okay to use OTC like Dayquil, Delsym, Robitussin, or nighttime versions at night.   If those aren't working, I just sent script [facility] her [facility] benzonatate, which are stronger cough suppressants, but be careful as they can cause drowsiness. Please inform.

## 2018-11-19 NOTE — TELEPHONE ENCOUNTER
Pt called stating at her visit on 11/17/18 you prescribed her Zithromax. She stated her cough is bothering her, she is wondering if you can prescribe her a cough medicine or had any recommendations. Pt forgot to ask for one at her visit.    Please advise #[phone]

## 2018-12-20 ENCOUNTER — OFFICE VISIT (OUTPATIENT)
Dept: FAMILY MEDICINE | Facility: CLINIC | Age: 63
End: 2018-12-20

## 2018-12-20 VITALS
DIASTOLIC BLOOD PRESSURE: 96 MMHG | TEMPERATURE: 98.1 F | SYSTOLIC BLOOD PRESSURE: 148 MMHG | BODY MASS INDEX: 35.45 KG/M2 | WEIGHT: 223 LBS | OXYGEN SATURATION: 98 % | HEART RATE: 74 BPM

## 2018-12-20 DIAGNOSIS — J32.0 MAXILLARY SINUSITIS, UNSPECIFIED CHRONICITY: Primary | ICD-10-CM

## 2018-12-20 PROCEDURE — 99213 OFFICE O/P EST LOW 20 MIN: CPT | Performed by: FAMILY MEDICINE

## 2018-12-20 NOTE — NURSING NOTE
Evelyn is here today for a URI.    Pre-visit Screening:   Immunizations:  up to date  Colonoscopy:  is up to date  Mammogram: is not up to date  Asthma Action Test/Plan:  LOLA  PHQ9:  NA  GAD7:  NA  Questioned patient about current smoking habits Pt. has never smoked.  Ok to leave detailed message on voice mail for today's visit only Yes, phone # [phone]

## 2018-12-20 NOTE — PROGRESS NOTES
SUBJECTIVE:   Evelyn Medrano is a 63 year old female who complains of nasal congestion, nasal blockage, post nasal drip, green nasal discharge, headache, facial pressure, bilateral sinus pain, mild sore throat, earache, productive cough, wheezing, chills and fatigue for 30 days. She denies a history of myalgias, vomiting and chest pain and denies a history of asthma. Patient does not smoke cigarettes.    Pt was treated with Zpack by IVETT 18-got better but never back to normal    Pt has tried Mucinex, Sudafed, Flonase, Nyquil, Dayquil, Ricola and not going away    Patient Active Problem List   Diagnosis     Migraine without aura and without status migrainosus, not intractable     Diffuse cystic mastopathy     Choroidal Melanoma     ACP (advance care planning)     Health Care Home     GERD (gastroesophageal reflux disease)     Vitamin D deficiency     Obesity (BMI 30-39.9)     Obesity (BMI 35.0-39.9) with comorbidity (H)     Past Medical History:   Diagnosis Date     Choroidal Melanoma 2002    Follwwed by LYDIA Medeiros at [facility]   Initially treated with TTT X3, and then proton beam radiation in 2006     Variants of migraine, not elsewhere classified      Family History   Problem Relation Age of Onset     Neurologic Disorder Mother         migraines     Arthritis Mother 33        scleroderma - systemic     Neurologic Disorder Father          from MVA     Diabetes Brother      Diabetes Sister      Social History     Socioeconomic History     Marital status:      Spouse name: Not on file     Number of children: 2     Years of education: Not on file     Highest education level: Not on file   Social Needs     Financial resource strain: Not on file     Food insecurity - worry: Not on file     Food insecurity - inability: Not on file     Transportation needs - medical: Not on file     Transportation needs - non-medical: Not on file   Occupational History     Employer: NONE      Comment:     Tobacco Use     Smoking status: Never Smoker     Smokeless tobacco: Never Used   Substance and Sexual Activity     Alcohol use: Yes     Alcohol/week: 1.0 oz     Types: 2 Standard drinks or equivalent per week     Comment: rarely     Drug use: No     Sexual activity: Yes     Partners: Male     Comment:    Other Topics Concern      Service Not Asked     Blood Transfusions Not Asked     Caffeine Concern Not Asked     Occupational Exposure Not Asked     Hobby Hazards Not Asked     Sleep Concern Not Asked     Stress Concern Not Asked     Weight Concern Not Asked     Special Diet Not Asked     Back Care Not Asked     Exercise Yes     Bike Helmet Not Asked     Seat Belt Yes     Self-Exams Not Asked   Social History Narrative     Not on file     Past Surgical History:   Procedure Laterality Date     C ANESTH,SURGERY OF SHOULDER  2005    frozen shoulder     C APPENDECTOMY  1977     C DESTRUC RETINAL LESN,RADN IMPLNT  2005    [place], followed at [place], also [facility] HEATON TOTAL KNEE ARTHROPLASTY Left 2010    meniscus tear     COLONOSCOPY      Normal repeat in 5 years     COLONOSCOPY  2/2013    internal hemorrhoids; repeat in 10 years     DESTRUC RETINAL LESN,PHOTOCOAG  2003    right eye/ melanoma treatment     HC TOOTH EXTRACTION W/FORCEP  age 25     UPPER GI ENDOSCOPY  12/2011    normal       Current Outpatient Medications on File Prior to Visit:  IMITREX 6 MG/0.5ML SC SOLN 0.5 ML 1 TIME ONLY   MULTIVITAMIN OR 1 qd   omeprazole (PRILOSEC) 20 MG CR capsule Take 1 capsule (20 mg) by mouth daily     No current facility-administered medications on file prior to visit.      Allergies: No known allergies    Immunization History   Administered Date(s) Administered     Influenza (IIV3) PF 09/16/2011     Influenza Vaccine IM 3yrs+ 4 Valent IIV4 09/13/2013     TD (ADULT, 7+) 08/29/1995, 01/12/2005     TDAP Vaccine (Boostrix) 09/16/2011         OBJECTIVE:BP (!) 148/96 (BP Location: Left arm, Patient Position:  Sitting, Cuff Size: Adult Large)   Pulse 74   Temp 98.1  F (36.7  C) (Oral)   Wt 101.2 kg (223 lb)   LMP 02/26/2003   SpO2 98%   BMI 35.45 kg/m     She appears well, vital signs are as noted by the nurse. Ears normal.  Throat and pharynx normal.  Neck supple. No adenopathy in the neck. Nose is congested. Sinuses non tender. The chest is clear, without wheezes or rales.    ASSESSMENT:   Sinusitis    PLAN:augmentin,I reviewed the risks, benefits, and possible side effects of the medication.  The patient had an opportunity to ask any questions regarding the treatment plan. The patient was encouraged to call my office if any problems.  Symptomatic therapy suggested: push fluids, rest and use acetaminophen, ibuprofen, decongestant of choice as needed. Call or return to clinic prn if these symptoms worsen or fail to improve as anticipated.

## 2019-03-29 ENCOUNTER — OFFICE VISIT (OUTPATIENT)
Dept: FAMILY MEDICINE | Facility: CLINIC | Age: 64
End: 2019-03-29

## 2019-03-29 VITALS
OXYGEN SATURATION: 96 % | HEIGHT: 67 IN | SYSTOLIC BLOOD PRESSURE: 136 MMHG | WEIGHT: 231 LBS | DIASTOLIC BLOOD PRESSURE: 98 MMHG | HEART RATE: 80 BPM | TEMPERATURE: 97.6 F | BODY MASS INDEX: 36.26 KG/M2

## 2019-03-29 DIAGNOSIS — I10 BENIGN ESSENTIAL HYPERTENSION: ICD-10-CM

## 2019-03-29 DIAGNOSIS — Z01.419 ENCOUNTER FOR GYNECOLOGICAL EXAMINATION WITHOUT ABNORMAL FINDING: Primary | ICD-10-CM

## 2019-03-29 DIAGNOSIS — E66.01 MORBID OBESITY (H): ICD-10-CM

## 2019-03-29 DIAGNOSIS — Z12.31 ENCOUNTER FOR SCREENING MAMMOGRAM FOR BREAST CANCER: ICD-10-CM

## 2019-03-29 LAB
ALBUMIN (URINE): ABNORMAL MG/DL
APPEARANCE UR: ABNORMAL
BACTERIA, UR: ABNORMAL
BILIRUB UR QL: ABNORMAL
CASTS/LPF: ABNORMAL
COLOR UR: YELLOW
EP/HPF: ABNORMAL
ERYTHROCYTE [DISTWIDTH] IN BLOOD BY AUTOMATED COUNT: 13.5 %
GLUCOSE URINE: ABNORMAL MG/DL
HCT VFR BLD AUTO: 49.3 % (ref 35–47)
HEMOGLOBIN: 15.9 G/DL (ref 11.7–15.7)
HGB UR QL: ABNORMAL
KETONES UR QL: ABNORMAL MG/DL
LEUKOCYTE ESTERASE - QUEST: ABNORMAL
MCH RBC QN AUTO: 30.6 PG (ref 26–33)
MCHC RBC AUTO-ENTMCNC: 32.3 G/DL (ref 31–36)
MCV RBC AUTO: 95 FL (ref 78–100)
MISC.: ABNORMAL
NITRITE UR QL STRIP: ABNORMAL
PH UR STRIP: 5.5 PH (ref 5–7)
PLATELET COUNT - QUEST: 237 10^9/L (ref 150–375)
RBC # BLD AUTO: 5.19 10*12/L (ref 3.8–5.2)
RBC, UR MICRO: ABNORMAL (ref ?–2)
SP. GRAVITY: 1.02
UROBILINOGEN UR QL STRIP: 0.2 EU/DL (ref 0.2–1)
WBC # BLD AUTO: 4.6 10*9/L (ref 4–11)
WBC, UR MICRO: ABNORMAL (ref ?–2)

## 2019-03-29 PROCEDURE — 88142 CYTOPATH C/V THIN LAYER: CPT | Mod: 90 | Performed by: FAMILY MEDICINE

## 2019-03-29 PROCEDURE — 84443 ASSAY THYROID STIM HORMONE: CPT | Mod: 90 | Performed by: FAMILY MEDICINE

## 2019-03-29 PROCEDURE — 85027 COMPLETE CBC AUTOMATED: CPT | Performed by: FAMILY MEDICINE

## 2019-03-29 PROCEDURE — 80048 BASIC METABOLIC PNL TOTAL CA: CPT | Mod: 90 | Performed by: FAMILY MEDICINE

## 2019-03-29 PROCEDURE — 36415 COLL VENOUS BLD VENIPUNCTURE: CPT | Performed by: FAMILY MEDICINE

## 2019-03-29 PROCEDURE — 81001 URINALYSIS AUTO W/SCOPE: CPT | Performed by: FAMILY MEDICINE

## 2019-03-29 PROCEDURE — 87624 HPV HI-RISK TYP POOLED RSLT: CPT | Mod: 90 | Performed by: FAMILY MEDICINE

## 2019-03-29 PROCEDURE — 80061 LIPID PANEL: CPT | Mod: 90 | Performed by: FAMILY MEDICINE

## 2019-03-29 PROCEDURE — 99396 PREV VISIT EST AGE 40-64: CPT | Performed by: FAMILY MEDICINE

## 2019-03-29 RX ORDER — LISINOPRIL 10 MG/1
10 TABLET ORAL DAILY
Qty: 30 TABLET | Refills: 0 | Status: SHIPPED | OUTPATIENT
Start: 2019-03-29 | End: 2019-04-15

## 2019-03-29 ASSESSMENT — MIFFLIN-ST. JEOR: SCORE: 1637.5

## 2019-03-29 NOTE — NURSING NOTE
Patient is here for a full physical exam. Patient was given copy of healthcare directive today to fill out and turn back in when finished to have on file.     Pre-Visit Screening :  Immunizations : up to date  Colon Screening : is up to date  Mammogram: is now due and order was put in today   Asthma Action Test/Plan : NA  PHQ9 :  PHQ-2 done today   GAD7 :  No concerns  Patient's  BMI There is no height or weight on file to calculate BMI.  Questioned patient about current smoking habits.  Pt. has never smoked.  OK to leave a detailed voice message regarding today's visit Yes, phone # [phone]      ETOH screening:  Questions:  1-How often do you have a drink containing alcohol?                             5 times per week(s)  2-How many drinks containing alcohol do you have on a typical day when you are         Drinking?                              1 and 2   3- How often do you have 5 or more drinks on one occasion?                              Only on special occasions such as weddings

## 2019-03-29 NOTE — PROGRESS NOTES
Chief Complaint: Evelyn Medrano is an 63 year old woman who presents for preventive health visit.      Besides routine health maintenance,  she would like to discuss :.     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 No-   Take multiple vitamin  Outside of work or daily activities, how many days per week do you exercise for 30 minutes or longer? Active with grandchildren  Have you had an eye exam in the past two years? yes  Do you see a dentist twice per year? Annual visits    PHQ-2  Over the last two weeks- Have you been bothered by little interest or pleasure in doing things?  No  Over the last two weeks- Have you been feeling down, depressed, or hopeless?  No         Advanced directive: advised    Social History     Tobacco Use     Smoking status: Never Smoker     Smokeless tobacco: Never Used   Substance Use Topics     Alcohol use: Yes     Alcohol/week: 1.0 oz     Types: 2 Standard drinks or equivalent per week     Comment: rarely     Alcohol intake: drinks wine in the evening (advised one drink per 24 hours/ no more than 7 in a week)    Reviewed orders with patient.  Reviewed health maintenance and updated orders accordingly -  "Yes      History of abnormal Pap smear: NO - age 30- 65 PAP every 3 years recommended  NO - age 30-65 PAP every 5 years with negative HPV co-testing recommended  All Histories reviewed and updated in [facility].      ROS:  CONSTITUTIONAL: NEGATIVE for fever, chills, change in weight  INTEGUMENTARY/SKIN: NEGATIVE for worrisome rashes, moles or lesions  EYES: NEGATIVE for vision changes or irritation  ENT: NEGATIVE for ear, mouth and throat problems  RESP: NEGATIVE for significant cough or SOB  BREAST: NEGATIVE for masses, tenderness or discharge  CV: NEGATIVE for chest pain, palpitations or peripheral edema  GI: NEGATIVE for nausea, abdominal pain, heartburn, or change in bowel habits  : Incontinence when coughing: . No vaginal bleeding.  MUSCULOSKELETAL: NEGATIVE for significant arthralgias or myalgia  NEURO: NEGATIVE for weakness, dizziness or paresthesias  PSYCHIATRIC: NEGATIVE for changes in mood or affect       OBJECTIVE:  BP (!) 136/98 (BP Location: Left arm, Patient Position: Sitting, Cuff Size: Adult Large)   Pulse 80   Temp 97.6  F (36.4  C) (Oral)   Ht 1.689 m (5' 6.5\")   Wt 104.8 kg (231 lb)   LMP 02/26/2003   SpO2 96%   BMI 36.73 kg/m    General appearance: Healthy    Skin: Normal. No atypical appearing moles on inspection of trunk and extremities.    External ears  and canals clear bilaterally. TM's normal bilaterally. Nose normal without lesions or discharge. Oropharynx normal. Neck supple without palpable adenopathy.    Breasts are symmetric.  No dominant, discrete, fixed  or suspicious masses are noted.  No skin or nipple changes or axillary nodes.      Regular rate and  rhythm. S1 and S2 normal, no murmurs, clicks, gallops or rubs. No edema or JVD. Chest is clear; no wheezes or rales.    The abdomen is soft without tenderness, guarding, mass or organomegaly. Bowel sounds are normal. No CVA tenderness or inguinal adenopathy noted.    Pelvic:  Vagina and vulva are normal.   No palpable uterine or " adnexal masses or tenderness.  Pap obtained and pending.    Rectal exam: deferred    Extremities: negative.      COUNSELING:  Reviewed preventive health counseling, as reflected in patient instructions  Special attention given to:        Regular exercise       Healthy diet/nutrition       Osteoporosis Prevention/Bone Health       Advance Care Planning    ATP III Guidelines  ICSI Preventive Guidelines    ASSESSMENT/PLAN:  (Z01.419) Encounter for gynecological examination without abnormal finding  (primary encounter diagnosis)  Comment:  dexa scan discussed  Plan: HEMOGRAM/PLATELET (B[facility]), Lipid Profile, BASIC         METABOLIC PANEL ([facility]), VENOUS COLLECTION,         ThinPrep Pap and HPV (mRNa E6/E7) ([facility]), TSH with free T4 reflex ([facility])            (Z12.31) Encounter for screening mammogram for breast cancer  Comment:   Plan: Mammo Screening digital (bilat)            (E66.01) Obesity (BMI 35.0-39.9) with comorbidity (H)  Comment:   Plan: Lipid Profile, BASIC METABOLIC PANEL ([facility]),         VENOUS COLLECTION, TSH with free T4 reflex         ([facility])            (I10) Benign essential hypertension  Comment: RECHECK IN ONE MONTH- NEW RX FOR LISINOPRIL  Plan: Lipid Profile, BASIC METABOLIC PANEL ([facility]),         VENOUS COLLECTION, lisinopril         (PRINIVIL/ZESTRIL) 10 MG tablet, HCL          Urinalysis, Routine (BFP)              Sleep study  Advised with new diagnosis of hypertension and morbid obesity

## 2019-03-29 NOTE — PATIENT INSTRUCTIONS
shingrix is advised : vaccine for shingles    Vitamin D3 1000 IU daily     Consider a sleep study-

## 2019-03-30 LAB
BUN SERPL-MCNC: 14 MG/DL (ref 7–25)
BUN/CREATININE RATIO: ABNORMAL (CALC) (ref 6–22)
CALCIUM SERPL-MCNC: 9.9 MG/DL (ref 8.6–10.4)
CHLORIDE SERPLBLD-SCNC: 107 MMOL/L (ref 98–110)
CHOLEST SERPL-MCNC: 196 MG/DL
CHOLEST/HDLC SERPL: 2.5 (CALC)
CO2 SERPL-SCNC: 22 MMOL/L (ref 20–32)
CREAT SERPL-MCNC: 0.87 MG/DL (ref 0.5–0.99)
EGFR AFRICAN AMERICAN - QUEST: 83 ML/MIN/1.73M2
GFR SERPL CREATININE-BSD FRML MDRD: 72 ML/MIN/1.73M2
GLUCOSE - QUEST: 109 MG/DL (ref 65–99)
HDLC SERPL-MCNC: 79 MG/DL
LDLC SERPL CALC-MCNC: 94 MG/DL (CALC)
NONHDLC SERPL-MCNC: 117 MG/DL (CALC)
POTASSIUM SERPL-SCNC: 3.9 MMOL/L (ref 3.5–5.3)
SODIUM SERPL-SCNC: 140 MMOL/L (ref 135–146)
TRIGL SERPL-MCNC: 124 MG/DL
TSH SERPL-ACNC: 4 MIU/L (ref 0.4–4.5)

## 2019-04-02 ENCOUNTER — MYC MEDICAL ADVICE (OUTPATIENT)
Dept: FAMILY MEDICINE | Facility: CLINIC | Age: 64
End: 2019-04-02

## 2019-04-02 NOTE — TELEPHONE ENCOUNTER
From: Evelyn Medrano  To: Brandy Leonardo MD  Sent: 4/2/2019 8:52 AM CDT  Subject: A follow-up question for a visit within the past seven days    Good morning Dr Leonardo  I have looked at all my test results on line here, and would love just a recap from you on what my next steps should be. I don't understand all the results.  I see there might be an issue with my glucose and cholesterol, and is there some medication I can be taking before I come in to follow up on my blood pressure medication?  Look forward to hearing from you!  Evelyn Medrano

## 2019-04-03 ENCOUNTER — MYC MEDICAL ADVICE (OUTPATIENT)
Dept: FAMILY MEDICINE | Facility: CLINIC | Age: 64
End: 2019-04-03

## 2019-04-03 LAB
CLINICAL HISTORY - QUEST: NORMAL
COMMENT - QUEST: NORMAL
CYTOTECHNOLOGIST - QUEST: NORMAL
DESCRIPTIVE DIAGNOSIS - QUEST: NORMAL
HPV MRNA E6/E7: NOT DETECTED
LAST PAP DX - QUEST: NORMAL
LMP - QUEST: NORMAL
PREV BX DX - QUEST: NORMAL
SOURCE: NORMAL
STATEMENT OF ADEQUACY - QUEST: NORMAL

## 2019-04-03 NOTE — TELEPHONE ENCOUNTER
From: Evelyn Medrano  To: Brandy Leonardo MD  Sent: 4/3/2019 8:41 AM CDT  Subject: A follow-up question for a visit within the past seven days    Good morning Dr Leonardo.  For some reason I was not able to reply to your email regarding my test results, and I do have questions about my red blood count, my thyroid, and also my prediabetes diagnosis. My  takes Metformin for his prediabetes diagnosis, is it possible I could also do that and get a jump start on things? It has seemed to help him, and taking that along with more exercise and healthier diet would hopefully start working. As I mentioned at the physical, I am not able to do as much exercise with my knee, so any help I could get would be great.  If I need to come into the lab to do another blood test for the diabetes, I can certainly do that before my follow-up appointment on April 29th.  Thank You!  Evelyn Medrano

## 2019-04-15 ENCOUNTER — OFFICE VISIT (OUTPATIENT)
Dept: FAMILY MEDICINE | Facility: CLINIC | Age: 64
End: 2019-04-15

## 2019-04-15 VITALS
SYSTOLIC BLOOD PRESSURE: 126 MMHG | DIASTOLIC BLOOD PRESSURE: 80 MMHG | TEMPERATURE: 98.2 F | OXYGEN SATURATION: 96 % | WEIGHT: 231 LBS | BODY MASS INDEX: 36.73 KG/M2 | HEART RATE: 65 BPM

## 2019-04-15 DIAGNOSIS — R73.09 ABNORMAL GLUCOSE: ICD-10-CM

## 2019-04-15 DIAGNOSIS — I10 BENIGN ESSENTIAL HYPERTENSION: ICD-10-CM

## 2019-04-15 DIAGNOSIS — R31.29 MICROSCOPIC HEMATURIA: Primary | ICD-10-CM

## 2019-04-15 DIAGNOSIS — R71.8 HIGH MEAN CORPUSCULAR HEMOGLOBIN CONCENTRATION (MCHC): ICD-10-CM

## 2019-04-15 LAB
ALBUMIN (URINE): ABNORMAL MG/DL
APPEARANCE UR: ABNORMAL
BACTERIA, UR: ABNORMAL
BILIRUB UR QL: ABNORMAL
CASTS/LPF: ABNORMAL
COLOR UR: YELLOW
EP/HPF: ABNORMAL
GLUCOSE URINE: ABNORMAL MG/DL
HBA1C MFR BLD: 5.5 % (ref 4–7)
HEMOGLOBIN: 15.7 G/DL (ref 11.7–15.7)
HGB UR QL: ABNORMAL
KETONES UR QL: ABNORMAL MG/DL
LEUKOCYTE ESTERASE - QUEST: ABNORMAL
MISC.: ABNORMAL
NITRITE UR QL STRIP: ABNORMAL
PH UR STRIP: 5.5 PH (ref 5–7)
RBC, UR MICRO: ABNORMAL (ref ?–2)
SP. GRAVITY: >=1.03
UROBILINOGEN UR QL STRIP: 0.2 EU/DL (ref 0.2–1)
WBC, UR MICRO: ABNORMAL (ref ?–2)

## 2019-04-15 PROCEDURE — 81001 URINALYSIS AUTO W/SCOPE: CPT | Performed by: FAMILY MEDICINE

## 2019-04-15 PROCEDURE — 83036 HEMOGLOBIN GLYCOSYLATED A1C: CPT | Performed by: FAMILY MEDICINE

## 2019-04-15 PROCEDURE — 99213 OFFICE O/P EST LOW 20 MIN: CPT | Performed by: FAMILY MEDICINE

## 2019-04-15 PROCEDURE — 85018 HEMOGLOBIN: CPT | Performed by: FAMILY MEDICINE

## 2019-04-15 PROCEDURE — 82728 ASSAY OF FERRITIN: CPT | Mod: 90 | Performed by: FAMILY MEDICINE

## 2019-04-15 PROCEDURE — 36415 COLL VENOUS BLD VENIPUNCTURE: CPT | Performed by: FAMILY MEDICINE

## 2019-04-15 PROCEDURE — 80048 BASIC METABOLIC PNL TOTAL CA: CPT | Mod: 90 | Performed by: FAMILY MEDICINE

## 2019-04-15 RX ORDER — LISINOPRIL 10 MG/1
10 TABLET ORAL DAILY
Qty: 90 TABLET | Refills: 3 | Status: SHIPPED | OUTPATIENT
Start: 2019-04-15 | End: 2020-03-18 | Stop reason: ALTCHOICE

## 2019-04-15 NOTE — PROGRESS NOTES
"SUBJECTIVE:  63 year old female presents for recheck of her blood pressure.    See Office visit 3/28/2019  New diagnosis of hypertension  \"RECHECK IN ONE MONTH- NEW RX FOR LISINOPRIL 10 mg\"  Blood pressures outside of office- none  She denies side effects    Review of her lab results-   Abnormal UA: ?? Clean catch- recheck recommended  Still has cloudy urine with specific gravity 1.030  3-5 WBC, RBC (moderate blood on dip)      Abnormal fasting blood sugar, patient wonders if she should start metformin  Today's Hemoglobin A1C is Normal    Hemoglobin   Date Value Ref Range Status   04/15/2019 15.7 11.7 - 15.7 g/dL Final   03/29/2019 15.9 (A) 11.7 - 15.7 g/dL Final     No family history of hemochromatosis  With concentrated urine- dehydration may be contributing to high hematocrit  Ferritin is pending      OBJECTIVE:  /80 (BP Location: Left arm, Patient Position: Sitting, Cuff Size: Adult Large)   Pulse 65   Temp 98.2  F (36.8  C) (Oral)   Wt 104.8 kg (231 lb)   LMP 02/26/2003   SpO2 96%   BMI 36.73 kg/m     Heart: RRR    Lab Results   Component Value Date    CR 1.01 04/15/2019     GFR: 60    Assessment   (R31.29) Microscopic hematuria  (primary encounter diagnosis)  Comment:   Plan: HCL  Urinalysis, Routine ([facility])            (I10) Benign essential hypertension  Comment: controlled  Plan: BASIC METABOLIC PANEL ([facility]), VENOUS         COLLECTION, Hemoglobin A1c ([facility]), HCL          Urinalysis, Routine ([facility]), CL AFF HEMOGLOBIN         ([facility]), lisinopril (PRINIVIL/ZESTRIL) 10 MG         tablet            (R73.09) Abnormal glucose  Comment: elevated fasting blood sugar, but normal Hemoglobin A1C- focus is healthy lifestyle- no metformin  Plan: BASIC METABOLIC PANEL ([facility]), VENOUS         COLLECTION, Hemoglobin A1c ([facility]), CANCELED:         Hemoglobin ([facility])            (R71.8) High mean corpuscular hemoglobin concentration (MCHC)  Comment: borderline normal results- monitor  Plan: VENOUS COLLECTION, CL AFF " HEMOGLOBIN ([facility]),         FERRITIN ([facility])                 PLAN:  Continue lisinopril at her current dose- refilled for one year  Drink more water (she contends that she drinks a lot of fluids)  Healthy lifestyle- regular exercise (will not be able to commit until she has knee surgery- planned for summer)  Weight loss- mindful eating    Recheck in one year

## 2019-04-15 NOTE — NURSING NOTE
Patient is here for follow up from last visit and to review test results.    Pre-visit Screening:  Immunizations:  up to date  Colonoscopy:  is up to date  Mammogram: is up to date  Asthma Action Test/Plan:  NA  PHQ9:  na  GAD7:  NA  Questioned patient about current smoking habits Pt. has never smoked.  Ok to leave detailed message on voice mail for today's visit only yes, phone # [phone]

## 2019-04-16 LAB
BUN SERPL-MCNC: 17 MG/DL (ref 7–25)
BUN/CREATININE RATIO: 17 (CALC) (ref 6–22)
CALCIUM SERPL-MCNC: 10.1 MG/DL (ref 8.6–10.4)
CHLORIDE SERPLBLD-SCNC: 105 MMOL/L (ref 98–110)
CO2 SERPL-SCNC: 23 MMOL/L (ref 20–32)
CREAT SERPL-MCNC: 1.01 MG/DL (ref 0.5–0.99)
EGFR AFRICAN AMERICAN - QUEST: 70 ML/MIN/1.73M2
FERRITIN SERPL-MCNC: 26 NG/ML (ref 20–288)
GFR SERPL CREATININE-BSD FRML MDRD: 60 ML/MIN/1.73M2
GLUCOSE - QUEST: 112 MG/DL (ref 65–99)
POTASSIUM SERPL-SCNC: 3.9 MMOL/L (ref 3.5–5.3)
SODIUM SERPL-SCNC: 140 MMOL/L (ref 135–146)

## 2019-04-17 ENCOUNTER — MYC MEDICAL ADVICE (OUTPATIENT)
Dept: FAMILY MEDICINE | Facility: CLINIC | Age: 64
End: 2019-04-17

## 2019-04-17 NOTE — TELEPHONE ENCOUNTER
From: Evelyn Medrano  To: Brandy Leonardo MD  Sent: 4/17/2019 5:18 PM CDT  Subject: A follow-up question for a visit within the past seven days    Hi Dr. Leonardo!   As I have [facility], I did notice that my glucose level was up at 112 oh, and the norm was up to a 99. The notes below said it indicated prediabetes oh, and I thought my levels were normal when we talked the other night. Is this something with my glucose that I should possibly be taking the prediabetes medication?  Thanks!   Evelyn Medrano

## 2019-05-20 ENCOUNTER — TRANSFERRED RECORDS (OUTPATIENT)
Dept: FAMILY MEDICINE | Facility: CLINIC | Age: 64
End: 2019-05-20

## 2019-07-03 ENCOUNTER — TRANSFERRED RECORDS (OUTPATIENT)
Dept: FAMILY MEDICINE | Facility: CLINIC | Age: 64
End: 2019-07-03

## 2019-07-10 ENCOUNTER — OFFICE VISIT (OUTPATIENT)
Dept: FAMILY MEDICINE | Facility: CLINIC | Age: 64
End: 2019-07-10

## 2019-07-10 VITALS
SYSTOLIC BLOOD PRESSURE: 126 MMHG | TEMPERATURE: 97.7 F | WEIGHT: 230 LBS | BODY MASS INDEX: 34.86 KG/M2 | HEART RATE: 90 BPM | OXYGEN SATURATION: 98 % | HEIGHT: 68 IN | DIASTOLIC BLOOD PRESSURE: 82 MMHG

## 2019-07-10 DIAGNOSIS — M17.10 ARTHRITIS OF KNEE: ICD-10-CM

## 2019-07-10 DIAGNOSIS — Z01.818 PREOPERATIVE EXAMINATION: Primary | ICD-10-CM

## 2019-07-10 PROBLEM — E66.9 OBESITY (BMI 30-39.9): Status: RESOLVED | Noted: 2017-11-29 | Resolved: 2019-07-10

## 2019-07-10 LAB
BUN SERPL-MCNC: 14 MG/DL (ref 7–25)
BUN/CREATININE RATIO: 14.1 (ref 6–22)
CALCIUM SERPL-MCNC: 10.2 MG/DL (ref 8.6–10.3)
CHLORIDE SERPLBLD-SCNC: 105.7 MMOL/L (ref 98–110)
CO2 SERPL-SCNC: 28.4 MMOL/L (ref 20–32)
CREAT SERPL-MCNC: 0.99 MG/DL (ref 0.7–1.18)
ERYTHROCYTE [DISTWIDTH] IN BLOOD BY AUTOMATED COUNT: 13.3 %
GLUCOSE SERPL-MCNC: 107 MG/DL (ref 60–99)
HCT VFR BLD AUTO: 45.5 % (ref 35–47)
HEMOGLOBIN: 14.5 G/DL (ref 11.7–15.7)
MCH RBC QN AUTO: 30.6 PG (ref 26–33)
MCHC RBC AUTO-ENTMCNC: 31.9 G/DL (ref 31–36)
MCV RBC AUTO: 95.9 FL (ref 78–100)
PLATELET COUNT - QUEST: 247 10^9/L (ref 150–375)
POTASSIUM SERPL-SCNC: 4.48 MMOL/L (ref 3.5–5.3)
RBC # BLD AUTO: 4.74 10*12/L (ref 3.8–5.2)
SODIUM SERPL-SCNC: 141.9 MMOL/L (ref 135–146)
WBC # BLD AUTO: 5.5 10*9/L (ref 4–11)

## 2019-07-10 PROCEDURE — 85027 COMPLETE CBC AUTOMATED: CPT | Performed by: FAMILY MEDICINE

## 2019-07-10 PROCEDURE — 36415 COLL VENOUS BLD VENIPUNCTURE: CPT | Performed by: FAMILY MEDICINE

## 2019-07-10 PROCEDURE — 93000 ELECTROCARDIOGRAM COMPLETE: CPT | Performed by: FAMILY MEDICINE

## 2019-07-10 PROCEDURE — 80048 BASIC METABOLIC PNL TOTAL CA: CPT | Performed by: FAMILY MEDICINE

## 2019-07-10 PROCEDURE — 99214 OFFICE O/P EST MOD 30 MIN: CPT | Performed by: FAMILY MEDICINE

## 2019-07-10 ASSESSMENT — MIFFLIN-ST. JEOR: SCORE: 1648.83

## 2019-07-10 NOTE — PROGRESS NOTES
[facility]  [address]  [address]  [facility] [address]  [phone]  Dept: [phone]    PRE-OP EVALUATION:  Today's date: 7/10/2019    Evelyn Medrano (: 1955) presents for pre-operative evaluation assessment as requested by Dr. Atkins.  She requires evaluation and anesthesia risk assessment prior to undergoing surgery/procedure for treatment of right knee replacement.    Proposed Surgery/ Procedure: right knee replacement  Date of Surgery/ Procedure: 19  Time of Surgery/ Procedure: 8:00 am  Hospital/Surgical Facility: [facility]  Fax number for surgical facility: [phone]  Primary Physician: Brandy Leonardo  Type of Anesthesia Anticipated: to be determined    Patient has a Health Care Directive or Living Will:  YES     1. NO - Do you have a history of heart attack, stroke, stent, bypass or surgery on an artery in the head, neck, heart or legs?  2. NO - Do you ever have any pain or discomfort in your chest?  3. NO - Do you have a history of  Heart Failure?  4. NO - Are you troubled by shortness of breath when: walking on the level, up a slight hill or at night?  5. NO - Do you currently have a cold, bronchitis or other respiratory infection?  6. NO - Do you have a cough, shortness of breath or wheezing?  7. NO - Do you sometimes get pains in the calves of your legs when you walk?  8. NO - Do you or anyone in your family have previous history of blood clots?  9. NO - Do you or does anyone in your family have a serious bleeding problem such as prolonged bleeding following surgeries or cuts?  10. NO - Have you ever had problems with anemia or been told to take iron pills?  11. NO - Have you had any abnormal blood loss such as black, tarry or bloody stools, or abnormal vaginal bleeding?  12. NO - Have you ever had a blood transfusion?  13. NO - Have you or any of your relatives ever had problems with anesthesia?  14. NO - Do you have sleep apnea,  excessive snoring or daytime drowsiness?  15. NO - Do you have any prosthetic heart valves?  16. NO - Do you have prosthetic joints?  17. NO - Is there any chance that you may be pregnant?      HPI:     HPI related to upcoming procedure: right knee arthritis-            MEDICAL HISTORY:     Patient Active Problem List    Diagnosis Date Noted     Choroidal Melanoma 09/16/2002     Priority: High     Follwwed by LYDIA Medeiros at [place]    Initially treated with TTT X3, and then proton beam radiation in April 2006       Arthritis of knee 07/10/2019     Priority: Medium     Obesity (BMI 35.0-39.9) with comorbidity (H) 11/17/2018     Priority: Medium     GERD (gastroesophageal reflux disease) 12/06/2014     Priority: Medium     Health Care Home 02/22/2013     Priority: Medium     State Tier Level:  Tier 0  Status:  n/a  Care Coordinator:  n/a     See Letters for H Care Plan           ACP (advance care planning) 09/30/2010     Priority: Medium     Advance Care Planning 11/29/2017: ACP Review of Chart / Resources Provided:  Reviewed chart for advance care plan.  Evelyn PEREZ Medrano has no plan or code status on file however states presence of ACP document. Copy requested.   Added by Netta Pederson                   Migraine without aura and without status migrainosus, not intractable 03/22/2000     Priority: Medium      Past Medical History:   Diagnosis Date     Choroidal Melanoma 9/16/2002    Follwwed by E4Dora Medeiros at [place]   Initially treated with TTT X3, and then proton beam radiation in April 2006     Variants of migraine, not elsewhere classified      Past Surgical History:   Procedure Laterality Date     C ANESTH,SURGERY OF SHOULDER  2005    frozen shoulder     C APPENDECTOMY  1977     C DESTRUC RETINAL JACOB MARTIN IMPLNT  2005    [place], followed at [place], also [facility] HEATON TOTAL KNEE ARTHROPLASTY Left 2010    meniscus tear     COLONOSCOPY      Normal repeat in 5 years     COLONOSCOPY  2/2013    internal  "hemorrhoids; repeat in 10 years     DESTRUC RETINAL LESN,PHOTOCOAG  2003    right eye/ melanoma treatment     HC TOOTH EXTRACTION W/FORCEP  age 25     UPPER GI ENDOSCOPY  12/2011    normal     Current Outpatient Medications   Medication Sig Dispense Refill     IMITREX 6 MG/0.5ML SC SOLN 0.5 ML 1 TIME ONLY       lisinopril (PRINIVIL/ZESTRIL) 10 MG tablet Take 1 tablet (10 mg) by mouth daily 90 tablet 3     MULTIVITAMIN OR 1 qd       omeprazole (PRILOSEC) 20 MG CR capsule Take 1 capsule (20 mg) by mouth daily 90 capsule 1     OTC products: no recent use of OTC ASA, NSAIDS or Steroids  Ibuprofen discontinued one week before surgery    Allergies   Allergen Reactions     No Known Allergies       Latex Allergy: NO    Social History     Tobacco Use     Smoking status: Never Smoker     Smokeless tobacco: Never Used   Substance Use Topics     Alcohol use: Yes     Alcohol/week: 1.0 oz     Types: 2 Standard drinks or equivalent per week     Comment: rarely     History   Drug Use No       REVIEW OF SYSTEMS:   CONSTITUTIONAL: NEGATIVE for fever, chills, change in weight  ENT/MOUTH: NEGATIVE for ear, mouth and throat problems  RESP: NEGATIVE for significant cough or SOB  CV: NEGATIVE for chest pain, palpitations or peripheral edema  GI: NEGATIVE for nausea, abdominal pain, heartburn, or change in bowel habits  : negative for dysuria  MUSCULOSKELETAL: POSITIVE for knee pain    EXAM:   /82 (BP Location: Left arm, Patient Position: Sitting, Cuff Size: Adult Large)   Pulse 90   Temp 97.7  F (36.5  C) (Oral)   Ht 1.715 m (5' 7.5\")   Wt 104.3 kg (230 lb)   LMP 02/26/2003   SpO2 98%   BMI 35.49 kg/m    GENERAL APPEARANCE: healthy, alert and no distress  HENT: ear canals and TM's normal and nose and mouth without ulcers or lesions  RESP: lungs clear to auscultation - no rales, rhonchi or wheezes  CV: regular rate and rhythm, normal S1 S2, no S3 or S4 and no murmur, click or rub   ABDOMEN: soft, nontender, no HSM or masses " and bowel sounds normal  SKIN: no suspicious lesions or rashes  NEURO: Normal strength and tone, sensory exam grossly normal, mentation intact and speech normal  NEURO: Normal strength and tone, mentation intact and speech normal  PSYCH: affect normal/bright    DIAGNOSTICS:   EKG: appears normal, NSR    Hemoglobin   Date Value Ref Range Status   07/10/2019 14.5 11.7 - 15.7 g/dL Final   ]  Potassium   Date Value Ref Range Status   07/10/2019 4.48 3.5 - 5.3 mmol/L Final     Lab Results   Component Value Date    CR 0.99 07/10/2019       Recent Labs   Lab Test 04/15/19  1800 04/15/19  1748 04/15/19  1741 03/29/19  0857 03/29/19  0853  06/06/17  1332  02/22/13  1132   HGB  --  15.7  --  15.9*  --    < >  --   --  16.0   PLT  --   --   --  237  --   --   --   --  231     --   --   --  140  --   --    < >  --    POTASSIUM 3.9  --   --   --  3.9  --   --    < >  --    CR 1.01*  --   --   --  0.87  --   --    < >  --    A1C  --   --  5.5  --   --   --  5.6  --   --     < > = values in this interval not displayed.        IMPRESSION:   Reason for surgery/procedure: arthritic knee- TKA      The proposed surgical procedure is considered INTERMEDIATE risk.    REVISED CARDIAC RISK INDEX  The patient has the following serious cardiovascular risks for perioperative complications such as (MI, PE, VFib and 3  AV Block):  No serious cardiac risks  INTERPRETATION: 0 risks: Class I (very low risk - 0.4% complication rate)    The patient has the following additional risks for perioperative complications:  No identified additional risks      ICD-10-CM    1. Preoperative examination Z01.818 EKG 12-lead complete w/read - Clinics     Basic Metabolic Panel (BFP)     VENOUS COLLECTION     HEMOGRAM/PLATELET (BFP)   2. Arthritis of knee M17.10       RECOMMENDATIONS:         --Patient is to take all scheduled medications on the day of surgery EXCEPT for modifications listed below.    ACE Inhibitor or Angiotensin Receptor Blocker (ARB)  Use  Ace inhibitor or Angiotensin Receptor Blocker (ARB) and should HOLD this medication for the 24 hours prior to surgery.      APPROVAL GIVEN to proceed with proposed procedure, without further diagnostic evaluation       Signed Electronically by: Brandy Leonardo MD    Copy of this evaluation report is provided to requesting physician.    [facility] Preop Guidelines    Revised Cardiac Risk Index

## 2019-07-16 ENCOUNTER — DOCUMENTATION ONLY (OUTPATIENT)
Dept: OTHER | Facility: CLINIC | Age: 64
End: 2019-07-16

## 2019-07-29 ENCOUNTER — TRANSFERRED RECORDS (OUTPATIENT)
Dept: FAMILY MEDICINE | Facility: CLINIC | Age: 64
End: 2019-07-29

## 2019-08-13 ENCOUNTER — TRANSFERRED RECORDS (OUTPATIENT)
Dept: FAMILY MEDICINE | Facility: CLINIC | Age: 64
End: 2019-08-13

## 2019-09-17 ENCOUNTER — TRANSFERRED RECORDS (OUTPATIENT)
Dept: FAMILY MEDICINE | Facility: CLINIC | Age: 64
End: 2019-09-17

## 2019-09-27 ENCOUNTER — HEALTH MAINTENANCE LETTER (OUTPATIENT)
Age: 64
End: 2019-09-27

## 2019-12-23 ENCOUNTER — TELEPHONE (OUTPATIENT)
Dept: FAMILY MEDICINE | Facility: CLINIC | Age: 64
End: 2019-12-23

## 2019-12-23 NOTE — TELEPHONE ENCOUNTER
Evelyn called asking if you could please change her BP prescription.  She is currently on Lisinopril and the cough is getting worse.  Please call into [facility] [address] in [place].    Patient's phone #[phone]

## 2019-12-24 NOTE — TELEPHONE ENCOUNTER
Stop lisinopril for a week and observe symptoms over the next week    Schedule an appointment to discuss is recommended

## 2020-01-06 ENCOUNTER — TELEPHONE (OUTPATIENT)
Dept: FAMILY MEDICINE | Facility: CLINIC | Age: 65
End: 2020-01-06

## 2020-01-06 DIAGNOSIS — I10 BENIGN ESSENTIAL HYPERTENSION: Primary | ICD-10-CM

## 2020-01-06 NOTE — TELEPHONE ENCOUNTER
Evelyn called and she has been off of lisinopril for a few weeks due to a cough reaction. She is requesting Dr. Leonardo to send in an alternative that she can try until she can get in for her annual in March.    I informed her you are retiring at the end of March.    Her pharmacy is [facility] in [place] on [address].      Phone # [phone]

## 2020-01-07 RX ORDER — LOSARTAN POTASSIUM 50 MG/1
50 TABLET ORAL DAILY
Qty: 90 TABLET | Refills: 0 | Status: SHIPPED | OUTPATIENT
Start: 2020-01-07 | End: 2020-03-18

## 2020-03-18 ENCOUNTER — TELEPHONE (OUTPATIENT)
Dept: FAMILY MEDICINE | Facility: CLINIC | Age: 65
End: 2020-03-18

## 2020-03-18 DIAGNOSIS — I10 BENIGN ESSENTIAL HYPERTENSION: ICD-10-CM

## 2020-03-18 RX ORDER — LOSARTAN POTASSIUM 50 MG/1
50 TABLET ORAL DAILY
Qty: 30 TABLET | Refills: 0 | COMMUNITY
Start: 2020-03-18 | End: 2020-06-10

## 2020-03-25 ENCOUNTER — APPOINTMENT (OUTPATIENT)
Dept: CT IMAGING | Facility: CLINIC | Age: 65
DRG: 195 | End: 2020-03-25
Attending: EMERGENCY MEDICINE
Payer: COMMERCIAL

## 2020-03-25 ENCOUNTER — APPOINTMENT (OUTPATIENT)
Dept: ULTRASOUND IMAGING | Facility: CLINIC | Age: 65
DRG: 195 | End: 2020-03-25
Attending: INTERNAL MEDICINE
Payer: COMMERCIAL

## 2020-03-25 ENCOUNTER — HOSPITAL ENCOUNTER (INPATIENT)
Facility: CLINIC | Age: 65
LOS: 3 days | Discharge: HOME | DRG: 195 | End: 2020-03-28
Attending: EMERGENCY MEDICINE | Admitting: INTERNAL MEDICINE
Payer: COMMERCIAL

## 2020-03-25 DIAGNOSIS — R07.9 CHEST PAIN, UNSPECIFIED TYPE: Primary | ICD-10-CM

## 2020-03-25 DIAGNOSIS — J12.82 PNEUMONIA DUE TO 2019 NOVEL CORONAVIRUS: ICD-10-CM

## 2020-03-25 DIAGNOSIS — U07.1 PNEUMONIA DUE TO 2019 NOVEL CORONAVIRUS: ICD-10-CM

## 2020-03-25 DIAGNOSIS — R09.02 HYPOXIA: ICD-10-CM

## 2020-03-25 LAB
ALBUMIN SERPL-MCNC: 3.8 G/DL (ref 3.4–5)
ALP SERPL-CCNC: 97 U/L (ref 40–150)
ALT SERPL W P-5'-P-CCNC: 44 U/L (ref 0–50)
ANION GAP SERPL CALCULATED.3IONS-SCNC: 7 MMOL/L (ref 3–14)
AST SERPL W P-5'-P-CCNC: 28 U/L (ref 0–45)
BASOPHILS # BLD AUTO: 0.1 10E9/L (ref 0–0.2)
BASOPHILS NFR BLD AUTO: 0.7 %
BILIRUB SERPL-MCNC: 1 MG/DL (ref 0.2–1.3)
BUN SERPL-MCNC: 15 MG/DL (ref 7–30)
CALCIUM SERPL-MCNC: 9.6 MG/DL (ref 8.5–10.1)
CHLORIDE SERPL-SCNC: 103 MMOL/L (ref 94–109)
CO2 SERPL-SCNC: 26 MMOL/L (ref 20–32)
CREAT SERPL-MCNC: 0.94 MG/DL (ref 0.52–1.04)
DIFFERENTIAL METHOD BLD: NORMAL
EOSINOPHIL # BLD AUTO: 0 10E9/L (ref 0–0.7)
EOSINOPHIL NFR BLD AUTO: 0 %
ERYTHROCYTE [DISTWIDTH] IN BLOOD BY AUTOMATED COUNT: 14.5 % (ref 10–15)
GFR SERPL CREATININE-BSD FRML MDRD: 65 ML/MIN/{1.73_M2}
GLUCOSE SERPL-MCNC: 130 MG/DL (ref 70–99)
HCT VFR BLD AUTO: 42.7 % (ref 35–47)
HGB BLD-MCNC: 13.7 G/DL (ref 11.7–15.7)
IMM GRANULOCYTES # BLD: 0 10E9/L (ref 0–0.4)
IMM GRANULOCYTES NFR BLD: 0.2 %
INR PPP: 0.91 (ref 0.86–1.14)
INTERPRETATION ECG - MUSE: NORMAL
LIPASE SERPL-CCNC: 147 U/L (ref 73–393)
LYMPHOCYTES # BLD AUTO: 1.9 10E9/L (ref 0.8–5.3)
LYMPHOCYTES NFR BLD AUTO: 21 %
MCH RBC QN AUTO: 30.6 PG (ref 26.5–33)
MCHC RBC AUTO-ENTMCNC: 32.1 G/DL (ref 31.5–36.5)
MCV RBC AUTO: 95 FL (ref 78–100)
MONOCYTES # BLD AUTO: 1 10E9/L (ref 0–1.3)
MONOCYTES NFR BLD AUTO: 11 %
NEUTROPHILS # BLD AUTO: 6.2 10E9/L (ref 1.6–8.3)
NEUTROPHILS NFR BLD AUTO: 67.1 %
NRBC # BLD AUTO: 0 10*3/UL
NRBC BLD AUTO-RTO: 0 /100
NT-PROBNP SERPL-MCNC: 83 PG/ML (ref 0–900)
PLATELET # BLD AUTO: 217 10E9/L (ref 150–450)
POTASSIUM SERPL-SCNC: 3.6 MMOL/L (ref 3.4–5.3)
PROCALCITONIN SERPL-MCNC: 0.12 NG/ML
PROT SERPL-MCNC: 8.1 G/DL (ref 6.8–8.8)
RBC # BLD AUTO: 4.48 10E12/L (ref 3.8–5.2)
SODIUM SERPL-SCNC: 136 MMOL/L (ref 133–144)
TROPONIN I SERPL-MCNC: <0.015 UG/L (ref 0–0.04)
TROPONIN I SERPL-MCNC: <0.015 UG/L (ref 0–0.04)
WBC # BLD AUTO: 9.2 10E9/L (ref 4–11)

## 2020-03-25 PROCEDURE — 36415 COLL VENOUS BLD VENIPUNCTURE: CPT | Performed by: EMERGENCY MEDICINE

## 2020-03-25 PROCEDURE — 80053 COMPREHEN METABOLIC PANEL: CPT | Performed by: EMERGENCY MEDICINE

## 2020-03-25 PROCEDURE — 71275 CT ANGIOGRAPHY CHEST: CPT

## 2020-03-25 PROCEDURE — 96376 TX/PRO/DX INJ SAME DRUG ADON: CPT

## 2020-03-25 PROCEDURE — 87040 BLOOD CULTURE FOR BACTERIA: CPT | Performed by: EMERGENCY MEDICINE

## 2020-03-25 PROCEDURE — 83880 ASSAY OF NATRIURETIC PEPTIDE: CPT | Performed by: EMERGENCY MEDICINE

## 2020-03-25 PROCEDURE — 76705 ECHO EXAM OF ABDOMEN: CPT

## 2020-03-25 PROCEDURE — 84484 ASSAY OF TROPONIN QUANT: CPT | Performed by: EMERGENCY MEDICINE

## 2020-03-25 PROCEDURE — 25000128 H RX IP 250 OP 636: Performed by: INTERNAL MEDICINE

## 2020-03-25 PROCEDURE — 84145 PROCALCITONIN (PCT): CPT | Performed by: EMERGENCY MEDICINE

## 2020-03-25 PROCEDURE — 85025 COMPLETE CBC W/AUTO DIFF WBC: CPT | Performed by: EMERGENCY MEDICINE

## 2020-03-25 PROCEDURE — 84484 ASSAY OF TROPONIN QUANT: CPT | Performed by: INTERNAL MEDICINE

## 2020-03-25 PROCEDURE — 25800030 ZZH RX IP 258 OP 636: Performed by: EMERGENCY MEDICINE

## 2020-03-25 PROCEDURE — 25000128 H RX IP 250 OP 636: Performed by: EMERGENCY MEDICINE

## 2020-03-25 PROCEDURE — 96361 HYDRATE IV INFUSION ADD-ON: CPT

## 2020-03-25 PROCEDURE — 96374 THER/PROPH/DIAG INJ IV PUSH: CPT

## 2020-03-25 PROCEDURE — 99285 EMERGENCY DEPT VISIT HI MDM: CPT | Mod: 25

## 2020-03-25 PROCEDURE — U0001 2019-NCOV DIAGNOSTIC P: HCPCS | Performed by: EMERGENCY MEDICINE

## 2020-03-25 PROCEDURE — 12000000 ZZH R&B MED SURG/OB

## 2020-03-25 PROCEDURE — 25000132 ZZH RX MED GY IP 250 OP 250 PS 637: Performed by: INTERNAL MEDICINE

## 2020-03-25 PROCEDURE — 96375 TX/PRO/DX INJ NEW DRUG ADDON: CPT

## 2020-03-25 PROCEDURE — 36415 COLL VENOUS BLD VENIPUNCTURE: CPT

## 2020-03-25 PROCEDURE — 25000125 ZZHC RX 250: Performed by: EMERGENCY MEDICINE

## 2020-03-25 PROCEDURE — 93005 ELECTROCARDIOGRAM TRACING: CPT

## 2020-03-25 PROCEDURE — 99223 1ST HOSP IP/OBS HIGH 75: CPT | Mod: AI | Performed by: INTERNAL MEDICINE

## 2020-03-25 PROCEDURE — 85610 PROTHROMBIN TIME: CPT | Performed by: EMERGENCY MEDICINE

## 2020-03-25 PROCEDURE — 83690 ASSAY OF LIPASE: CPT | Performed by: EMERGENCY MEDICINE

## 2020-03-25 RX ORDER — ONDANSETRON 4 MG/1
4 TABLET, ORALLY DISINTEGRATING ORAL EVERY 6 HOURS PRN
Status: DISCONTINUED | OUTPATIENT
Start: 2020-03-25 | End: 2020-03-28 | Stop reason: HOSPADM

## 2020-03-25 RX ORDER — LIDOCAINE 40 MG/G
CREAM TOPICAL
Status: DISCONTINUED | OUTPATIENT
Start: 2020-03-25 | End: 2020-03-28 | Stop reason: HOSPADM

## 2020-03-25 RX ORDER — SUMATRIPTAN 6 MG/.5ML
6 INJECTION, SOLUTION SUBCUTANEOUS EVERY 12 HOURS PRN
Status: DISCONTINUED | OUTPATIENT
Start: 2020-03-25 | End: 2020-03-28 | Stop reason: HOSPADM

## 2020-03-25 RX ORDER — NALOXONE HYDROCHLORIDE 0.4 MG/ML
.1-.4 INJECTION, SOLUTION INTRAMUSCULAR; INTRAVENOUS; SUBCUTANEOUS
Status: DISCONTINUED | OUTPATIENT
Start: 2020-03-25 | End: 2020-03-28 | Stop reason: HOSPADM

## 2020-03-25 RX ORDER — POLYETHYLENE GLYCOL 3350 17 G/17G
17 POWDER, FOR SOLUTION ORAL DAILY PRN
Status: DISCONTINUED | OUTPATIENT
Start: 2020-03-25 | End: 2020-03-28 | Stop reason: HOSPADM

## 2020-03-25 RX ORDER — HYDROMORPHONE HYDROCHLORIDE 1 MG/ML
0.2 INJECTION, SOLUTION INTRAMUSCULAR; INTRAVENOUS; SUBCUTANEOUS
Status: DISCONTINUED | OUTPATIENT
Start: 2020-03-25 | End: 2020-03-28 | Stop reason: HOSPADM

## 2020-03-25 RX ORDER — HYDROMORPHONE HYDROCHLORIDE 1 MG/ML
0.3 INJECTION, SOLUTION INTRAMUSCULAR; INTRAVENOUS; SUBCUTANEOUS
Status: DISCONTINUED | OUTPATIENT
Start: 2020-03-25 | End: 2020-03-25

## 2020-03-25 RX ORDER — ACETAMINOPHEN 325 MG/1
650 TABLET ORAL EVERY 4 HOURS PRN
Status: DISCONTINUED | OUTPATIENT
Start: 2020-03-25 | End: 2020-03-28 | Stop reason: HOSPADM

## 2020-03-25 RX ORDER — ONDANSETRON 2 MG/ML
4 INJECTION INTRAMUSCULAR; INTRAVENOUS EVERY 30 MIN PRN
Status: DISCONTINUED | OUTPATIENT
Start: 2020-03-25 | End: 2020-03-25

## 2020-03-25 RX ORDER — IOPAMIDOL 755 MG/ML
500 INJECTION, SOLUTION INTRAVASCULAR ONCE
Status: COMPLETED | OUTPATIENT
Start: 2020-03-25 | End: 2020-03-25

## 2020-03-25 RX ORDER — MORPHINE SULFATE 4 MG/ML
4 INJECTION, SOLUTION INTRAMUSCULAR; INTRAVENOUS
Status: COMPLETED | OUTPATIENT
Start: 2020-03-25 | End: 2020-03-25

## 2020-03-25 RX ORDER — ONDANSETRON 2 MG/ML
4 INJECTION INTRAMUSCULAR; INTRAVENOUS EVERY 6 HOURS PRN
Status: DISCONTINUED | OUTPATIENT
Start: 2020-03-25 | End: 2020-03-28 | Stop reason: HOSPADM

## 2020-03-25 RX ORDER — OXYCODONE HYDROCHLORIDE 5 MG/1
5 TABLET ORAL EVERY 4 HOURS PRN
Status: DISCONTINUED | OUTPATIENT
Start: 2020-03-25 | End: 2020-03-28 | Stop reason: HOSPADM

## 2020-03-25 RX ORDER — HYDROXYZINE HYDROCHLORIDE 25 MG/1
25-50 TABLET, FILM COATED ORAL EVERY 6 HOURS PRN
Status: DISCONTINUED | OUTPATIENT
Start: 2020-03-25 | End: 2020-03-28 | Stop reason: HOSPADM

## 2020-03-25 RX ADMIN — ONDANSETRON 4 MG: 4 TABLET, ORALLY DISINTEGRATING ORAL at 10:58

## 2020-03-25 RX ADMIN — HYDROMORPHONE HYDROCHLORIDE 0.2 MG: 1 INJECTION, SOLUTION INTRAMUSCULAR; INTRAVENOUS; SUBCUTANEOUS at 15:09

## 2020-03-25 RX ADMIN — SODIUM CHLORIDE 88 ML: 9 INJECTION, SOLUTION INTRAVENOUS at 01:23

## 2020-03-25 RX ADMIN — SODIUM CHLORIDE 1000 ML: 9 INJECTION, SOLUTION INTRAVENOUS at 00:41

## 2020-03-25 RX ADMIN — IOPAMIDOL 69 ML: 755 INJECTION, SOLUTION INTRAVENOUS at 01:23

## 2020-03-25 RX ADMIN — OMEPRAZOLE 20 MG: 20 CAPSULE, DELAYED RELEASE ORAL at 13:53

## 2020-03-25 RX ADMIN — OXYCODONE HYDROCHLORIDE 5 MG: 5 TABLET ORAL at 21:28

## 2020-03-25 RX ADMIN — ONDANSETRON 4 MG: 2 INJECTION INTRAMUSCULAR; INTRAVENOUS at 00:43

## 2020-03-25 RX ADMIN — OXYCODONE HYDROCHLORIDE 5 MG: 5 TABLET ORAL at 16:26

## 2020-03-25 RX ADMIN — HYDROMORPHONE HYDROCHLORIDE 1 MG: 1 INJECTION, SOLUTION INTRAMUSCULAR; INTRAVENOUS; SUBCUTANEOUS at 03:59

## 2020-03-25 RX ADMIN — HYDROMORPHONE HYDROCHLORIDE 0.2 MG: 1 INJECTION, SOLUTION INTRAMUSCULAR; INTRAVENOUS; SUBCUTANEOUS at 20:11

## 2020-03-25 RX ADMIN — MORPHINE SULFATE 4 MG: 4 INJECTION INTRAVENOUS at 02:43

## 2020-03-25 RX ADMIN — HYDROMORPHONE HYDROCHLORIDE 0.3 MG: 1 INJECTION, SOLUTION INTRAMUSCULAR; INTRAVENOUS; SUBCUTANEOUS at 11:27

## 2020-03-25 RX ADMIN — HYDROXYZINE HYDROCHLORIDE 25 MG: 25 TABLET, FILM COATED ORAL at 20:11

## 2020-03-25 RX ADMIN — MORPHINE SULFATE 4 MG: 4 INJECTION INTRAVENOUS at 00:45

## 2020-03-25 RX ADMIN — HYDROMORPHONE HYDROCHLORIDE 0.3 MG: 1 INJECTION, SOLUTION INTRAMUSCULAR; INTRAVENOUS; SUBCUTANEOUS at 08:17

## 2020-03-25 RX ADMIN — MORPHINE SULFATE 4 MG: 4 INJECTION INTRAVENOUS at 01:14

## 2020-03-25 ASSESSMENT — ENCOUNTER SYMPTOMS
FEVER: 0
COUGH: 1
NAUSEA: 0
SHORTNESS OF BREATH: 0
VOMITING: 0
ABDOMINAL PAIN: 1

## 2020-03-25 ASSESSMENT — ACTIVITIES OF DAILY LIVING (ADL)
TOILETING: 0-->INDEPENDENT
ADLS_ACUITY_SCORE: 11
SWALLOWING: 0-->SWALLOWS FOODS/LIQUIDS WITHOUT DIFFICULTY
AMBULATION: 0-->INDEPENDENT
ADLS_ACUITY_SCORE: 11
DRESS: 0-->INDEPENDENT
RETIRED_COMMUNICATION: 0-->UNDERSTANDS/COMMUNICATES WITHOUT DIFFICULTY
BATHING: 0-->INDEPENDENT
TRANSFERRING: 0-->INDEPENDENT
COGNITION: 0 - NO COGNITION ISSUES REPORTED
ADLS_ACUITY_SCORE: 11
ADLS_ACUITY_SCORE: 11
RETIRED_EATING: 0-->INDEPENDENT
FALL_HISTORY_WITHIN_LAST_SIX_MONTHS: NO

## 2020-03-25 ASSESSMENT — MIFFLIN-ST. JEOR
SCORE: 1561.05
SCORE: 1683.52

## 2020-03-25 NOTE — ED NOTES
[facility]  ED Nurse Handoff Report    Evelyn Medrano is a 64 year old female   ED Chief complaint: Chest Pain  . ED Diagnosis:   Final diagnoses:   Pneumonia due to 2019 Novel Coronavirus   Hypoxia     Allergies:   Allergies   Allergen Reactions     No Known Allergies        Code Status: Full Code  Activity level - Baseline/Home:  Independent. Activity Level - Current:   Assist X 1. Lift room needed: No. Bariatric: No   Needed: No   Isolation: Yes. Infection: COVID 19 rule out .     Vital Signs:   Vitals:    03/25/20 0245 03/25/20 0300 03/25/20 0315 03/25/20 0330   BP: (!) 127/96 128/61 123/64 127/69   Pulse: 86 82 80 83   Resp: 18 24 14 20   Temp:       TempSrc:       SpO2: 94% 95% 94% 94%   Weight:       Height:           Cardiac Rhythm:  ,   Cardiac  Cardiac Rhythm: Normal sinus rhythm  Pain level: 0-10 Pain Scale: 8  Patient confused: No. Patient Falls Risk: Yes.   Elimination Status: Has voided   Patient Report - Initial Complaint: Chest pain, SOB . Focused Assessment:    Here for mid to right side chest pain started yesterday, worsens at 6pm. Chills. Pain worse with movement and breathing. Took motrin afternoon but did not help. ABCs intact.    Cardiac - Cardiac WDL: -WDL except; chest pain   Review of Systems (Cardiac) - Cardiovascular Symptoms/Conditions: chest pain   Chest Pain Assessment - Rating (0-10): 10  Chest Pain Location: midsternal (under right breast radiates to abdominal area and intermittently under left breast.) Duration: constant, with intermittent shooting chest pain   Precipitating Factors: unknown   Cardiac Monitoring - EKG Monitoring: Yes  Cardiac Regularity: Regular  Cardiac Rhythm: NSR   Chest Pain Assessment - Character: tightness   Manage Acute Chest Pain - Chest Pain Intervention: cardiac biomarkers drawn; cardiac monitor placed; 12-lead ECG obtained; cardiac monitoring continued; activity minimized   PT reports pain under R breast that radiates into  back, pt is nanny for work and 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  No visualized pulmonary embolism.      2.  Small area of infiltrate or atelectasis right middle lobe.      3.  Mild dependent opacities bilaterally, greater on the right, may be atelectasis or minimal infiltrate. Trace right pleural fluid. Coronal varus related pneumonia cannot be excluded.      CRITICAL FINDING:  The finding of potential coronal virus related pneumonia was called to Randall, the patient's RN, by Dr. Jg Luna on 3/25/2020 2:00 AM.             . Abnormal Results:   Labs Ordered and Resulted from Time of ED Arrival Up to the Time of Departure from the ED   COMPREHENSIVE METABOLIC PANEL - Abnormal; Notable for the following components:       Result Value    Glucose 130 (*)     All other components within normal limits   TROPONIN I   INR   LIPASE   CBC WITH PLATELETS DIFFERENTIAL   NT PROBNP INPATIENT   PROCALCITONIN   PULSE OXIMETRY NURSING   CARDIAC CONTINUOUS MONITORING   PERIPHERAL IV CATHETER   COVID-19 VIRUS (CORONAVIRUS) PCR TO [place] DEPT OF HEALTH   BLOOD CULTURE   BLOOD CULTURE     .   Treatments provided: Meds, labs, imaging, tele monitoring, blood cultures   Family Comments: Called , Jayjay, to update on plan of care   OBS brochure/video discussed/provided to patient:  No  ED Medications:   Medications   ondansetron (ZOFRAN) injection 4 mg (4 mg Intravenous Given 3/25/20 0043)   HYDROmorphone (DILAUDID) injection 1 mg (has no administration in time range)   0.9% sodium chloride BOLUS (1,000 mLs Intravenous New Bag 3/25/20 0041)   morphine (PF) injection 4 mg (4 mg Intravenous Given 3/25/20 0243)   CT Scan Flush (88 mLs Intravenous Given 3/25/20 0123)   iopamidol (ISOVUE-370) solution 500 mL (69 mLs Intravenous Given 3/25/20 0123)     Drips infusing:  No  For the majority of the shift, the patient's  behavior Green. Interventions performed were N/A.    Sepsis treatment initiated: No       ED Nurse Name/Phone Number: Kayleen Bhagat RN,   3:30 AM    RECEIVING UNIT ED HANDOFF REVIEW    Above ED Nurse Handoff Report was reviewed: Yes  Reviewed by: Bibi Ortiz RN on March 25, 2020 at 3:50 AM

## 2020-03-25 NOTE — PHARMACY-ADMISSION MEDICATION HISTORY
Admission medication history interview status for this patient is complete. See [facility] admission navigator for allergy information, prior to admission medications and immunization status.     Medication history interview source(s):Patient  Medication history resources (including written lists, pill bottles, clinic record):[facility] list  Primary pharmacy:[facility]    Changes made to PTA medication list:  Added: None  Deleted: None  Changed: None    Actions taken by pharmacist (provider contacted, etc):None     Additional medication history information:None    Medication reconciliation/reorder completed by provider prior to medication history?  N   (Y/N)     For patients on insulin therapy: N  (Y/N)    Prior to Admission medications    Medication Sig Last Dose Taking? Auth Provider   IMITREX 6 MG/0.5ML SC SOLN Inject 6 mg Subcutaneous every 12 hours as needed Once at onset of headache, then can repeat x1 for total of 2 doses in 24 hrs Past Week at Unknown time Yes Reported, Patient   losartan (COZAAR) 50 MG tablet Take 1 tablet (50 mg) by mouth daily 3/24/2020 at Unknown time Yes Rosita Arriola PA-C   MULTIVITAMIN OR 1 qd Past Week at Unknown time Yes Reported, Patient   omeprazole (PRILOSEC) 20 MG CR capsule Take 1 capsule (20 mg) by mouth daily 3/24/2020 at Unknown time Yes Rosita Arriola PA-C

## 2020-03-25 NOTE — PROVIDER NOTIFICATION
MD paged: Pt. requesting to eat dinner. Can we advance diet?    Emmie Danielle RN on 3/25/2020 at 5:46 PM

## 2020-03-25 NOTE — H&P
[facility]    History and Physical - Hospitalist Service       Date of Admission:  3/25/2020     Assessment & Plan   Evelyn Medrano is a 64 year old female with a history of GERD, migraines and hypertension who is admitted to the hospitalist service after presenting with chest pain and being found to have CT findings concerning for coronavirus.  Patient was admitted when her oxygen saturation dipped below 93% and it was felt she would benefit from further monitoring, as patients with COVID-19 are at high risk for early decompensation.    Acute hypoxic respiratory failure  Right middle lobe atelectasis/infiltrate, bilateral lower lobe infiltrate/atelectasis  - Patient reports a very mild cough prior to admission without fever.  Has not been febrile here.  - Imaging findings on CT as outlined.  - COVID-19 testing has been ordered and pending.  - Patient has been placed on 2 L of oxygen, maintaining sats in the mid 90s, never dropped below 90%.  - Without elevation in WBC count or fever, do not feel empiric therapy for community-acquired pneumonia is necessary  - Wean oxygen as tolerated, would be reasonable to discontinue oxygen and see if she maintains her saturations above 90%.    Chest pain  - Patient describes right upper abdomen/right lower chest pain over the last two days which has been intermittent and exacerbated by eating, movement, exertion  - On exam she is tender over the right ribs under the right breast and slight tenderness in the right upper quadrant area.  - Laboratory work-up initially unremarkable, with negative troponin, normal bilirubin, normal transaminases.  - Pleuritic nature prompted to CT PE protocol which was negative for PE.  Finding of bilateral opacities unlikely to cause significant chest pain  - Etiology appears to be more likely musculoskeletal or pleurisy.  Doubt cardiac etiology with negative troponin and normal EKG, but will trend troponin to make sure.  Also  considered gallbladder pathology given report of worsening with eating.  Considered ordering right upper quadrant ultrasound to evaluate but am trying to minimize patient contacts given her PUI COVID-19 status.  Would consider ordering ultrasound if right upper quadrant pain becomes much worse.    Chronic medical conditions  Hypertension: Hold losartan in case of decompensation.  Blood pressures are okay.  GERD: Await med rec.     Diet: No caffeine  DVT Prophylaxis: Low Risk/Ambulatory with no VTE prophylaxis indicated  Lovett Catheter: No  Code Status: Full code    Disposition Plan   Expected discharge:   Probable discharge to home tomorrow.    Hwoie Luna MD  [facility]    ______________________________________________________________________    Chief Complaint   Chest Pain    History of Present Illness   Evelyn Medrano is a 64 year old female with a history of GERD, migraines and hypertension who presented to the ED complaining of chest pain.    Patient reports that over the past 2 days, she has had the onset of gradual worsening right upper quadrant/right lower chest pain under the breast.  It has seemed to get worse when she eats food, moves around, or does activity that involves exertion.  It was particularly worse with a deep breath.  She had a mild cough but no known fever, did have some chills.  Presented to the ED because the pain was not abating and became worse.  In the ED, vital signs notable for mild decrease in oxygen saturation on room air to 92%.  Otherwise vitals were stable.  Lab work-up was unremarkable.  Troponin negative.  EKG without sign of ischemia.  Given the concern for pulmonary embolism with the pleuritic chest discomfort, patient underwent CT pulmonary angiogram which did not show PE but did show small infiltrate in the right middle lobe, as well as mild dependent opacities bilaterally, greater on the right than on the left, atelectasis versus infiltrate.   "This was felt to be possibly consistent with COVID-19 and the patient was tested.    Patient is anxious about all the events as outlined above.  She states she has never had pain like this before in her life.  Admits to slight increase in alcohol use over the past couple weeks, estimates that she drinks about 10 drinks per week.  No drugs or tobacco use.  Has not been around known sick contacts.  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    Review of Systems    The 10 point Review of Systems is negative other than noted in the HPI or here.     Physical Exam   /69   Pulse 83   Temp 98.5  F (36.9  C) (Oral)   Resp 20   Ht 1.727 m (5' 8\")   Wt 95.3 kg (210 lb)   LMP 02/26/2003   SpO2 94%   BMI 31.93 kg/m         General: No acute distress    HEENT: No scleral icterus. Oropharynx moist.     Neck: Supple.    Pulmonary: Normal work of breathing. Clear to auscultation bilaterally.    Cardiovascular: Regular rate and rhythm without murmur or extra heart sounds.    Abdomen: Soft and non-tender.  Difficult to tell if right upper quadrant versus right lower chest pain to palpation.    Extremities: No peripheral edema. No clubbing or cyanosis.     Neurologic: Awake, alert, appropriate.    Skin: Warm and dry.    Psychiatric: Tearful and slightly anxious but appropriate    Data   Data reviewed today: I have reviewed all labs and imaging results.    Recent Labs   Lab 03/25/20  0110 03/25/20  0039   WBC 9.2  --    HGB 13.7  --    MCV 95  --      --    INR  --  0.91   NA  --  136   POTASSIUM  --  3.6   CHLORIDE  --  103   CO2  --  26   BUN  --  15   CR  --  0.94   ANIONGAP  --  7   DARREN  --  9.6   GLC  --  130*   ALBUMIN  --  3.8   PROTTOTAL  --  8.1   BILITOTAL  --  1.0   ALKPHOS  --  97   ALT  --  44   AST  --  28   LIPASE  --  147   TROPI  --  <0.015     Recent Results (from the past 24 hour(s))   CT Chest Pulmonary Embolism w Contrast    Narrative    EXAM: CT CHEST PULMONARY EMBOLISM W CONTRAST  LOCATION: " [facility]  DATE/TIME: 3/25/2020 1:19 AM    INDICATION: Short of breath.    COMPARISON: None.    TECHNIQUE: CT angiogram chest during arterial phase injection IV contrast. 2D and 3D MIP reconstructions were performed by the CT technologist. Dose reduction techniques were used.    CONTRAST: 69 mL Isovue-370.    FINDINGS:  ANGIOGRAM CHEST: No visualized pulmonary embolism. No thoracic aortic aneurysm or dissection.    LUNGS AND PLEURA: Focal area of infiltrate or atelectasis right middle lobe with a small area of consolidation. Minimal patchy opacities at the dependent portion of both lungs, greater on the right, may be atelectasis or minimal infiltrate. Trace pleural   fluid on the right.    MEDIASTINUM/AXILLAE: No pathologic adenopathy. Small hiatal hernia. Small opaque density within the hiatal hernia which may be a calcification or a portion of a tablet.    UPPER ABDOMEN: No acute findings.    MUSCULOSKELETAL: Normal.      Impression    IMPRESSION:  1.  No visualized pulmonary embolism.    2.  Small area of infiltrate or atelectasis right middle lobe.    3.  Mild dependent opacities bilaterally, greater on the right, may be atelectasis or minimal infiltrate. Trace right pleural fluid. Coronal varus related pneumonia cannot be excluded.    CRITICAL FINDING:  The finding of potential coronal virus related pneumonia was called to Randall, the patient's RN, by Dr. Jg Luna on 3/25/2020 2:00 AM.           Past Medical History    I have reviewed this patient's medical history and updated it with pertinent information if needed.   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  Past Surgical History:   Procedure Laterality Date     ARTHROSCOPY KNEE Right 2017    torn meniscus     C ANESTH,SURGERY OF SHOULDER      frozen shoulder     C APPENDECTOMY       C DESTRUC RETINAL JACOB MARTIN      [place], followed at [place], also [facility] Bustos     C TOTAL KNEE ARTHROPLASTY Left 2010    meniscus tear     COLONOSCOPY      Normal repeat in 5 years     COLONOSCOPY  2013    internal hemorrhoids; repeat in 10 years     DESTRUC RETINAL LESN,PHOTOCOAG      right eye/ melanoma treatment     HC TOOTH EXTRACTION W/FORCEP  age 25     TONSILLECTOMY       UPPER GI ENDOSCOPY  2011    normal        Social History    I have reviewed this patient's social history and updated it with pertinent information if needed.  Social History     Tobacco Use     Smoking status: Never Smoker     Smokeless tobacco: Never Used   Substance Use Topics     Alcohol use: Yes     Alcohol/week: 1.7 standard drinks     Types: 2 Standard drinks or equivalent per week     Comment: rarely     Drug use: No          Family History    I have reviewed this patient's family history and updated it with pertinent information if needed.   Family History   Problem Relation Age of Onset     Neurologic Disorder Mother         migraines     Arthritis Mother 33        scleroderma - systemic     Neurologic Disorder Father          from MVA     Diabetes Brother      Diabetes Sister           Prior to Admission Medications    Prior to Admission Medications   Prescriptions Last Dose Informant Patient Reported? Taking?   IMITREX 6 MG/0.5ML SC SOLN   Yes No   Si.5 ML 1 TIME ONLY   MULTIVITAMIN OR   Yes No   Si qd   losartan (COZAAR) 50 MG tablet   Yes No   Sig: Take 1 tablet (50 mg) by mouth daily   omeprazole (PRILOSEC) 20 MG CR capsule   Yes No   Sig: Take 1 capsule (20 mg) by mouth daily      Facility-Administered Medications: None        Allergies    Allergies   Allergen  Reactions     No Known Allergies

## 2020-03-25 NOTE — PROGRESS NOTES
Patient seen and examined.  Chart reviewed.      She does continue to have right upper quadrant abdominal pain.    Check abdominal ultrasound.      Otherwise, see admission history and physical from earlier today by Dr. Luna for details.

## 2020-03-25 NOTE — PLAN OF CARE
VSS, currently 1.5 lpm nc, 98%. Prn iv dilaudid x1 for pain during shift. A&O. Up ind. Ls diminished w/ shallow breathing. Deep breathing encouraged. Denied cough. Bowel sounds faint passing gas. Abd tender to touch. Cared for patient 0700 to 1050 am.

## 2020-03-25 NOTE — ED PROVIDER NOTES
"  History     Chief Complaint:  Chest Pain    HPI   Evelyn Medrano is a 64 year old female who presents with chest pain. The patient reports that a few days ago she started to develop \"severe\" intermittent diffuse chest pain that is primarily worse under the breasts. Tonight, she notes that the pain worsened therefore prompting her presentation. Earlier, she had some mild coughing as well as abdominal pain. The pain will worsen with palpation or movements. The patient denies nausea, vomtiing, rash, shortness of breath, or fever. She has been taking ibuprofen at home without relief and most recent dose 6 and a half hours ago. There has not been any known ill exposures or recent travel.     Allergies:  No known drug allergies.       Medications:    Imitrex   Cozaar  Prilosec     Past Medical History:    Choroidal melanoma  Arthritis of knee   Diffuse cystic mastopathy  variants of migraines   GERD  Obesity     Past Surgical History:    Arthroscopy knee  Shoulder surgery - frozen shoulder  destruc retinal lesn, radn implant   Total knee arthroplasty   Colonoscopy  Tooth extractions   Tonsillectomy  Upper GI endoscopy     Family History:    Mother: migraines, scleroderma  Brother: diabetes  Sister: diabetes     Social History:  The patient was unaccompanied to the ED.  Smoking Status: Never Smoker  Smokeless Tobacco: Never Used  Alcohol Use: Positive  Drug Use: Negative  PCP: Brandy Leonardo   Marital Status:        Review of Systems   Constitutional: Negative for fever.   Respiratory: Positive for cough. Negative for shortness of breath.    Cardiovascular: Positive for chest pain.   Gastrointestinal: Positive for abdominal pain. Negative for nausea and vomiting.   Skin: Negative for rash.   All other systems reviewed and are negative.    Physical Exam     Patient Vitals for the past 24 hrs:   BP Temp Temp src Pulse Heart Rate Resp SpO2 Height Weight   03/25/20 0230 114/67 -- -- 84 84 14 95 % -- -- " "  03/25/20 0215 128/75 -- -- 85 84 20 97 % -- --   03/25/20 0200 111/63 -- -- 79 80 16 94 % -- --   03/25/20 0115 (!) 147/68 -- -- 86 86 25 93 % -- --   03/25/20 0100 (!) 150/77 -- -- 89 89 19 92 % -- --   03/25/20 0030 (!) 170/88 -- -- -- -- -- -- -- --   03/25/20 0026 (!) 169/80 98.5  F (36.9  C) Oral 94 94 18 98 % 1.727 m (5' 8\") 95.3 kg (210 lb)      Physical Exam  Constitutional:       General: She is in acute distress.      Appearance: She is well-developed.   HENT:      Right Ear: Tympanic membrane and external ear normal.      Left Ear: Tympanic membrane and external ear normal.      Nose: Nose normal.      Mouth/Throat:      Mouth: Mucous membranes are moist.      Pharynx: Oropharynx is clear. No oropharyngeal exudate.   Eyes:      General: No scleral icterus.     Extraocular Movements: Extraocular movements intact.      Conjunctiva/sclera: Conjunctivae normal.      Pupils: Pupils are equal, round, and reactive to light.   Neck:      Musculoskeletal: Normal range of motion and neck supple.   Cardiovascular:      Rate and Rhythm: Normal rate and regular rhythm.      Pulses: Normal pulses.      Heart sounds: Normal heart sounds. No murmur. No friction rub. No gallop.    Pulmonary:      Effort: Pulmonary effort is normal. No respiratory distress.      Breath sounds: Normal breath sounds. No wheezing or rales.   Chest:      Chest wall: Tenderness present.      Comments: Diffusely tender to palpation  Abdominal:      General: Bowel sounds are normal. There is no distension.      Palpations: Abdomen is soft. There is no mass.      Tenderness: There is no abdominal tenderness.   Musculoskeletal: Normal range of motion.   Lymphadenopathy:      Cervical: No cervical adenopathy.   Skin:     General: Skin is warm and dry.      Findings: No rash.   Neurological:      General: No focal deficit present.      Mental Status: She is alert.      Cranial Nerves: No cranial nerve deficit.       Emergency Department Course "     ECG:  ECG taken at 0037, ECG read at 0037  Normal sinus rhythm  Nonspecific ST abnormality   Abnormal ECG  Rate 95 bpm. NH interval 170 ms. QRS duration 72 ms. QT/QTc 350/439 ms. P-R-T axes 21 56 31.    Imaging:  Radiology findings were communicated with the patient who voiced understanding of the findings.    CT Chest Pulmonary Embolism w Contrast  1.  No visualized pulmonary embolism.    2.  Small area of infiltrate or atelectasis right middle lobe.    3.  Mild dependent opacities bilaterally, greater on the right, may be atelectasis or minimal infiltrate. Trace right pleural fluid. Coronal varus related pneumonia cannot be excluded.    CRITICAL FINDING:  The finding of potential coronal virus related pneumonia was called to Randall, the patient's RN, by Dr. Jg Luna on 3/25/2020 2:00 AM.    Reading per radiology      Laboratory:  Laboratory findings were communicated with the patient who voiced understanding of the findings.    COVID-19 Virus (Coronavirus) PCR: Pending     CBC: WBC 9.2, HGB 13.7,   CMP: Glucose 130 (H), o/w WNL (Creatinine 0.94)    Troponin (Collected 0039): <0.015   Nt probnp inpatient: 83    INR: 0.91  Lipase: 147     Procalcitonin: Pending     Blood Culture x2: Pending     Interventions:  0041 NS 1000 mL IV  0043 Zofran 4 mg IV  0045 morphine 4 mg IV   0114 morphine 4 mg IV  0243 morphine 4 mg IV  0359 Dilaudid 1 mg IV    Emergency Department Course:    0032 Nursing notes and vitals reviewed. I performed an exam of the patient as documented above.     0037 EKG obtained as noted above.     0039 IV was inserted and blood was drawn for laboratory testing, results above.     0110 Blood drawn. This was sent to the lab for further testing, results above.     0119 The patient was sent for a CT while in the emergency department, results above.      0215 Patient rechecked and updated.      0234 I spoke with Dr. Luna of the hospitalist service from [facility] regarding patient's  presentation, findings, and plan of care.      Prior to admission, I personally reviewed the results with the patient and all related questions were answered. The patient verbalized understanding and is amenable to plan.     Impression & Plan    Medical Decision Making:  Evelyn Medrano is a 64 year old female who presents to the emergency department today for evaluation of bilateral reproducible chest pain for several days duration. She appears to be quite uncomfortable and she did have reproducible pain across her entire chest. There is no fever, she denies any infectious symptoms, and denies any known contact with COVID-19. She did have hypoxia that went as low as 92% and did require 2 L by nasal canula. Due to the nature of her chest pain, CT was performed and actually shows bilateral opacity concerning for possible COVID 19 infection. Due to likelihood that this is COVID-19 infection with pulmonary findings and hypoxia the patient requires admission. We did blood culture as well as Procalcitonin given what we known about COVID-19 infection now. She will be admitted and monitored closely. I did talk to her code satus intubation, she would like full code. The patient did talk to her family as well and she was in agreement with plan for admission. She was accepted by Dr. Luna.     Diagnosis:    ICD-10-CM    1. Pneumonia due to 2019 Novel Coronavirus  J12.89 COVID-19 Virus (Coronavirus) PCR to [place] [facility] Nasopharyngeal (NP) Swab in [place]    B97.29 Blood culture     Nt probnp inpatient     Nt probnp inpatient     Blood culture     CANCELED: NT proBNP inpatient and ED   2. Hypoxia  R09.02        Disposition:   The patient is admitted into the care of Dr. Luna.     Scribe Disclosure:  I, Orla Severson, am serving as a scribe at 12:30 AM on 3/25/2020 to document services personally performed by Myke Cordon MD based on my observations and the provider's statements to me.   [facility]  EMERGENCY DEPARTMENT       Myke Cordon MD  03/25/20 1005

## 2020-03-25 NOTE — PLAN OF CARE
For vital signs and complete assessments, please see documentation flowsheets.     Pertinent assessments: Pt w/ constant chest pain, midsternal, beneath breasts on both sides. States gets worse with taking deep breaths or coughing. SOB w/ exertion.skin flushed.  Major Shift Events: admitted this AM, settled into room  Treatment Plan: pain control, wait for COVID result    Discharge Readiness: Medically active  Expected Discharge Date: TBD  Discharge Disposition: Home with Self care  Barriers/Criteria for discharge: TBD

## 2020-03-25 NOTE — PLAN OF CARE
End of Shift Summary  For vital signs and complete assessments, please see documentation flowsheets.     Pertinent assessments: Constant chest pain under R breast, radiates to back. Given IV  dilaudid x 2. RUQ tenderness.  Major Shift Events: Limited abdominal US  Treatment Plan: US, pain control, wait for COVID result    Discharge Readiness: Medically active  Expected Discharge Date: TBD  Discharge Disposition: Home with Self care  Barriers/Criteria for discharge: TBD

## 2020-03-25 NOTE — ED TRIAGE NOTES
Here for mid to right side chest pain started yesterday, worsens at 6pm. Chills. Pain worse with movement and breathing. Took motrin afternoon but did not help. ABCs intact.

## 2020-03-26 ENCOUNTER — APPOINTMENT (OUTPATIENT)
Dept: CARDIOLOGY | Facility: CLINIC | Age: 65
DRG: 195 | End: 2020-03-26
Attending: INTERNAL MEDICINE
Payer: COMMERCIAL

## 2020-03-26 LAB
ALBUMIN SERPL-MCNC: 3.2 G/DL (ref 3.4–5)
ALP SERPL-CCNC: 83 U/L (ref 40–150)
ALT SERPL W P-5'-P-CCNC: 52 U/L (ref 0–50)
ANION GAP SERPL CALCULATED.3IONS-SCNC: 2 MMOL/L (ref 3–14)
AST SERPL W P-5'-P-CCNC: 40 U/L (ref 0–45)
BILIRUB DIRECT SERPL-MCNC: 0.2 MG/DL (ref 0–0.2)
BILIRUB SERPL-MCNC: 0.8 MG/DL (ref 0.2–1.3)
BUN SERPL-MCNC: 8 MG/DL (ref 7–30)
CALCIUM SERPL-MCNC: 9.3 MG/DL (ref 8.5–10.1)
CHLORIDE SERPL-SCNC: 110 MMOL/L (ref 94–109)
CO2 SERPL-SCNC: 28 MMOL/L (ref 20–32)
COVID-19 VIRUS PCR RESULT FROM MDH: NEGATIVE
CREAT SERPL-MCNC: 0.74 MG/DL (ref 0.52–1.04)
ERYTHROCYTE [DISTWIDTH] IN BLOOD BY AUTOMATED COUNT: 14.6 % (ref 10–15)
GFR SERPL CREATININE-BSD FRML MDRD: 86 ML/MIN/{1.73_M2}
GLUCOSE SERPL-MCNC: 98 MG/DL (ref 70–99)
HCT VFR BLD AUTO: 45.3 % (ref 35–47)
HGB BLD-MCNC: 14.4 G/DL (ref 11.7–15.7)
MCH RBC QN AUTO: 31.4 PG (ref 26.5–33)
MCHC RBC AUTO-ENTMCNC: 31.8 G/DL (ref 31.5–36.5)
MCV RBC AUTO: 99 FL (ref 78–100)
PLATELET # BLD AUTO: 213 10E9/L (ref 150–450)
POTASSIUM SERPL-SCNC: 3.9 MMOL/L (ref 3.4–5.3)
PROT SERPL-MCNC: 7.3 G/DL (ref 6.8–8.8)
RBC # BLD AUTO: 4.59 10E12/L (ref 3.8–5.2)
SODIUM SERPL-SCNC: 140 MMOL/L (ref 133–144)
WBC # BLD AUTO: 4.3 10E9/L (ref 4–11)

## 2020-03-26 PROCEDURE — 25000131 ZZH RX MED GY IP 250 OP 636 PS 637: Performed by: INTERNAL MEDICINE

## 2020-03-26 PROCEDURE — 25000132 ZZH RX MED GY IP 250 OP 250 PS 637: Performed by: INTERNAL MEDICINE

## 2020-03-26 PROCEDURE — 93306 TTE W/DOPPLER COMPLETE: CPT | Mod: 26 | Performed by: INTERNAL MEDICINE

## 2020-03-26 PROCEDURE — 80076 HEPATIC FUNCTION PANEL: CPT | Performed by: INTERNAL MEDICINE

## 2020-03-26 PROCEDURE — 80048 BASIC METABOLIC PNL TOTAL CA: CPT | Performed by: INTERNAL MEDICINE

## 2020-03-26 PROCEDURE — 36415 COLL VENOUS BLD VENIPUNCTURE: CPT | Performed by: INTERNAL MEDICINE

## 2020-03-26 PROCEDURE — 99233 SBSQ HOSP IP/OBS HIGH 50: CPT | Performed by: INTERNAL MEDICINE

## 2020-03-26 PROCEDURE — 25500064 ZZH RX 255 OP 636: Performed by: INTERNAL MEDICINE

## 2020-03-26 PROCEDURE — 40000264 ECHOCARDIOGRAM COMPLETE

## 2020-03-26 PROCEDURE — 85027 COMPLETE CBC AUTOMATED: CPT | Performed by: INTERNAL MEDICINE

## 2020-03-26 PROCEDURE — 12000000 ZZH R&B MED SURG/OB

## 2020-03-26 RX ORDER — LIDOCAINE 4 G/G
1 PATCH TOPICAL
Status: DISCONTINUED | OUTPATIENT
Start: 2020-03-26 | End: 2020-03-28 | Stop reason: HOSPADM

## 2020-03-26 RX ORDER — LOSARTAN POTASSIUM 25 MG/1
50 TABLET ORAL DAILY
Status: DISCONTINUED | OUTPATIENT
Start: 2020-03-26 | End: 2020-03-28 | Stop reason: HOSPADM

## 2020-03-26 RX ORDER — CEFTRIAXONE 1 G/1
1 INJECTION, POWDER, FOR SOLUTION INTRAMUSCULAR; INTRAVENOUS EVERY 24 HOURS
Status: DISCONTINUED | OUTPATIENT
Start: 2020-03-26 | End: 2020-03-28 | Stop reason: HOSPADM

## 2020-03-26 RX ORDER — DOCUSATE SODIUM 100 MG/1
200 CAPSULE, LIQUID FILLED ORAL DAILY
Status: DISCONTINUED | OUTPATIENT
Start: 2020-03-26 | End: 2020-03-28 | Stop reason: HOSPADM

## 2020-03-26 RX ADMIN — HUMAN ALBUMIN MICROSPHERES AND PERFLUTREN 3 ML: 10; .22 INJECTION, SOLUTION INTRAVENOUS at 14:15

## 2020-03-26 RX ADMIN — OMEPRAZOLE 20 MG: 20 CAPSULE, DELAYED RELEASE ORAL at 09:05

## 2020-03-26 RX ADMIN — LOSARTAN POTASSIUM 50 MG: 25 TABLET, FILM COATED ORAL at 14:09

## 2020-03-26 RX ADMIN — OXYCODONE HYDROCHLORIDE 5 MG: 5 TABLET ORAL at 22:14

## 2020-03-26 RX ADMIN — DOCUSATE SODIUM 200 MG: 100 CAPSULE, LIQUID FILLED ORAL at 14:09

## 2020-03-26 RX ADMIN — ACETAMINOPHEN 650 MG: 325 TABLET, FILM COATED ORAL at 09:16

## 2020-03-26 RX ADMIN — OXYCODONE HYDROCHLORIDE 5 MG: 5 TABLET ORAL at 04:16

## 2020-03-26 RX ADMIN — OXYCODONE HYDROCHLORIDE 5 MG: 5 TABLET ORAL at 00:27

## 2020-03-26 RX ADMIN — LIDOCAINE 1 PATCH: 560 PATCH PERCUTANEOUS; TOPICAL; TRANSDERMAL at 19:57

## 2020-03-26 RX ADMIN — ACETAMINOPHEN 650 MG: 325 TABLET, FILM COATED ORAL at 14:17

## 2020-03-26 RX ADMIN — SUMATRIPTAN 6 MG: 6 INJECTION, SOLUTION SUBCUTANEOUS at 12:16

## 2020-03-26 ASSESSMENT — ACTIVITIES OF DAILY LIVING (ADL)
ADLS_ACUITY_SCORE: 11

## 2020-03-26 NOTE — PROGRESS NOTES
[facility]    Hospitalist Progress Note    Assessment & Plan   Evelyn Medrano is a 64 year old female with a history of GERD, migraines and hypertension who is admitted to the hospitalist service after presenting with chest pain and being found to have CT findings concerning for coronavirus.  Patient was admitted when her oxygen saturation dipped below 93% and it was felt she would benefit from further monitoring, as patients with COVID-19 are at high risk for early decompensation.     Acute hypoxic respiratory failure  Right middle lobe atelectasis/infiltrate, bilateral lower lobe infiltrate/atelectasis  - Patient reports a very mild cough prior to admission without fever.  Has not been febrile here.  - Imaging findings on CT as outlined.  - COVID-19 testing negative.   - Patient has been placed on 2 L of oxygen, maintaining sats in the mid 90s, never dropped below 90%.  - Starting on CAP treatment.  - Wean oxygen as tolerated, would be reasonable to discontinue oxygen and see if she maintains her saturations above 90%.     Chest pain  - Patient describes right upper abdomen/right lower chest pain over the last two days which has been intermittent and exacerbated by eating, movement, exertion  - On exam she is tender over the right ribs under the right breast and slight tenderness in the right upper quadrant area.  - Laboratory work-up initially unremarkable, with negative troponin, normal bilirubin, normal transaminases.  - Pleuritic nature prompted to CT PE protocol which was negative for PE.  Finding of bilateral opacities unlikely to cause significant chest pain  - Etiology appears to be more likely musculoskeletal or pleurisy.      Chronic medical conditions  Hypertension: Hold losartan in case of decompensation.  Blood pressures are okay.  GERD: Await med rec.     Diet: No caffeine  DVT Prophylaxis: Low Risk/Ambulatory with no VTE prophylaxis indicated  Lovett Catheter: No  Code Status: Full  code     Disposition Plan   Expected discharge: Probable discharge to home tomorrow    Kirk Zendejas MD   Text Page (7am to 6pm)    Interval History   Pt is alert and oriented, up ad merced in the room. Pt on room air this am, 92% while asleep. Pt taking oxycodone with good relief of upper abdominal pain. Will order RUQ ultrasound.     -Data reviewed today: I reviewed all new labs and imaging results over the last 24 hours. I personally reviewed the EKG tracing showing NSR.    Physical Exam   Temp: 98.7  F (37.1  C) Temp src: Oral BP: (!) 157/60 Pulse: 68 Heart Rate: 72 Resp: 18 SpO2: 94 % O2 Device: None (Room air) Oxygen Delivery: 1.5 LPM  Vitals:    03/25/20 0026 03/25/20 0436   Weight: 95.3 kg (210 lb) 107.5 kg (237 lb)     Vital Signs with Ranges  Temp:  [97.9  F (36.6  C)-98.7  F (37.1  C)] 98.7  F (37.1  C)  Pulse:  [68] 68  Heart Rate:  [70-72] 72  Resp:  [16-20] 18  BP: (146-157)/(60-66) 157/60  SpO2:  [91 %-96 %] 94 %  I/O last 3 completed shifts:  In: 50 [P.O.:50]  Out: -     GEN: NAD, pleasant  HEENT: no icterus  CV: RRR, normal s1/s2, no murmurs/rubs/s3/s4, no heave. JVP normal.  CHEST: CTAB  ABD: soft, NT/ND, NABS  : no flank/suprapubic tenderness  NEURO: AA&Ox3, fluent/appropriate, motor grossly nonfocal  PSYCH: cooperative, affect appropriate    Medications       azithromycin  500 mg Intravenous Once     cefTRIAXone  1 g Intravenous Q24H     omeprazole  20 mg Oral Daily     sodium chloride (PF)  3 mL Intracatheter Q8H       Data   Recent Labs   Lab 03/26/20  0724 03/25/20  0500 03/25/20  0110 03/25/20  0039   WBC 4.3  --  9.2  --    HGB 14.4  --  13.7  --    MCV 99  --  95  --      --  217  --    INR  --   --   --  0.91     --   --  136   POTASSIUM 3.9  --   --  3.6   CHLORIDE 110*  --   --  103   CO2 28  --   --  26   BUN 8  --   --  15   CR 0.74  --   --  0.94   ANIONGAP 2*  --   --  7   DARREN 9.3  --   --  9.6   GLC 98  --   --  130*   ALBUMIN 3.2*  --   --  3.8   PROTTOTAL 7.3  --   --   8.1   BILITOTAL 0.8  --   --  1.0   ALKPHOS 83  --   --  97   ALT 52*  --   --  44   AST 40  --   --  28   LIPASE  --   --   --  147   TROPI  --  <0.015  --  <0.015       Imaging:   Recent Results (from the past 24 hour(s))   US Abdomen Limited Portable    Narrative    ULTRASOUND ABDOMEN LIMITED March 25, 2020 2:09 PM     HISTORY: Abdominal pain.    COMPARISON: None.    FINDINGS: Liver is moderately fatty infiltrated as evidenced by a  diffuse increase in echogenicity without focal lesions. Gallbladder is  normal without stones or sludge. Extrahepatic bile duct is normal in  diameter. Pancreas is normal where visualized. Right kidney is normal  in size. There is no hydronephrosis. Proximal aorta and IVC are  nonaneurysmal.      Impression    IMPRESSION:   1. No cholelithiasis or cholecystitis.  2. At least moderate fatty infiltration of the liver.    ECHO KUMARI MD

## 2020-03-26 NOTE — PLAN OF CARE
End of Shift Summary  For vital signs and complete assessments, please see documentation flowsheets.     Pertinent assessments: continues to have sharp RLQ pain. IV dilaudid and oxycodone given this evening.     Major Shift Events: pain control, COVID negative.

## 2020-03-26 NOTE — PLAN OF CARE
"  [place]: A&O  VS: BP (!) 147/70 (BP Location: Left arm)   Pulse 73   Temp 98.1  F (36.7  C) (Oral)   Resp 20   Ht 1.727 m (5' 8\")   Wt 107.5 kg (237 lb)   LMP 02/26/2003   SpO2 97%   BMI 36.04 kg/m    LS: clear on RA, mild LITTLE  GI: active, last BM 3/24, denies constipation, colace given per order  : BR  Skin: intact  Activity: ind   Diet: reg   Pain: c/o 6/10 midsternum-epigastric pain, taking tylenol PRN, sched lidocaine patches, PRN oxy  Lab: echo complete this shift, please see results  Plan: xray & lab in am, possible discharge tmrrw am. Continue to monitor.           "

## 2020-03-26 NOTE — PLAN OF CARE
Received a call from lab that patient's COVID-19 test came back negative. Admitting provider paged with results, bedside nurse made aware.    Lauren Morton RN on 3/25/2020 at 7:25 PM

## 2020-03-26 NOTE — PLAN OF CARE
Pt is alert and oriented, up ad merced in the room. Pt on room air this am, 92% while asleep. Pt taking oxycodone with good relief of upper abdominal pain. Pt is tolerating a regular diet. Pt is a possible discharge to home today or tomorrow.

## 2020-03-27 LAB
ANION GAP SERPL CALCULATED.3IONS-SCNC: 6 MMOL/L (ref 3–14)
BASOPHILS # BLD AUTO: 0.1 10E9/L (ref 0–0.2)
BASOPHILS NFR BLD AUTO: 1.2 %
BUN SERPL-MCNC: 11 MG/DL (ref 7–30)
CALCIUM SERPL-MCNC: 9.4 MG/DL (ref 8.5–10.1)
CHLORIDE SERPL-SCNC: 110 MMOL/L (ref 94–109)
CO2 SERPL-SCNC: 25 MMOL/L (ref 20–32)
CREAT SERPL-MCNC: 0.88 MG/DL (ref 0.52–1.04)
DIFFERENTIAL METHOD BLD: NORMAL
EOSINOPHIL # BLD AUTO: 0 10E9/L (ref 0–0.7)
EOSINOPHIL NFR BLD AUTO: 0 %
ERYTHROCYTE [DISTWIDTH] IN BLOOD BY AUTOMATED COUNT: 14.4 % (ref 10–15)
GFR SERPL CREATININE-BSD FRML MDRD: 70 ML/MIN/{1.73_M2}
GLUCOSE SERPL-MCNC: 101 MG/DL (ref 70–99)
HCT VFR BLD AUTO: 44.8 % (ref 35–47)
HGB BLD-MCNC: 14.2 G/DL (ref 11.7–15.7)
IMM GRANULOCYTES # BLD: 0 10E9/L (ref 0–0.4)
IMM GRANULOCYTES NFR BLD: 0.2 %
LYMPHOCYTES # BLD AUTO: 1.5 10E9/L (ref 0.8–5.3)
LYMPHOCYTES NFR BLD AUTO: 35.6 %
MCH RBC QN AUTO: 30.7 PG (ref 26.5–33)
MCHC RBC AUTO-ENTMCNC: 31.7 G/DL (ref 31.5–36.5)
MCV RBC AUTO: 97 FL (ref 78–100)
MONOCYTES # BLD AUTO: 0.4 10E9/L (ref 0–1.3)
MONOCYTES NFR BLD AUTO: 10.7 %
NEUTROPHILS # BLD AUTO: 2.1 10E9/L (ref 1.6–8.3)
NEUTROPHILS NFR BLD AUTO: 52.3 %
NRBC # BLD AUTO: 0 10*3/UL
NRBC BLD AUTO-RTO: 0 /100
PLATELET # BLD AUTO: 231 10E9/L (ref 150–450)
POTASSIUM SERPL-SCNC: 3.8 MMOL/L (ref 3.4–5.3)
RBC # BLD AUTO: 4.63 10E12/L (ref 3.8–5.2)
SODIUM SERPL-SCNC: 141 MMOL/L (ref 133–144)
WBC # BLD AUTO: 4.1 10E9/L (ref 4–11)

## 2020-03-27 PROCEDURE — 85004 AUTOMATED DIFF WBC COUNT: CPT | Performed by: INTERNAL MEDICINE

## 2020-03-27 PROCEDURE — 25000128 H RX IP 250 OP 636: Performed by: INTERNAL MEDICINE

## 2020-03-27 PROCEDURE — 85025 COMPLETE CBC W/AUTO DIFF WBC: CPT | Performed by: INTERNAL MEDICINE

## 2020-03-27 PROCEDURE — 99232 SBSQ HOSP IP/OBS MODERATE 35: CPT | Performed by: INTERNAL MEDICINE

## 2020-03-27 PROCEDURE — 25000132 ZZH RX MED GY IP 250 OP 250 PS 637: Performed by: INTERNAL MEDICINE

## 2020-03-27 PROCEDURE — 12000000 ZZH R&B MED SURG/OB

## 2020-03-27 PROCEDURE — 80048 BASIC METABOLIC PNL TOTAL CA: CPT | Performed by: INTERNAL MEDICINE

## 2020-03-27 PROCEDURE — 85027 COMPLETE CBC AUTOMATED: CPT | Performed by: INTERNAL MEDICINE

## 2020-03-27 PROCEDURE — 36415 COLL VENOUS BLD VENIPUNCTURE: CPT | Performed by: INTERNAL MEDICINE

## 2020-03-27 PROCEDURE — 25800030 ZZH RX IP 258 OP 636: Performed by: INTERNAL MEDICINE

## 2020-03-27 PROCEDURE — 99207 ZZC CDG-MDM COMPONENT: MEETS LOW - DOWN CODED: CPT | Performed by: INTERNAL MEDICINE

## 2020-03-27 RX ADMIN — OMEPRAZOLE 20 MG: 20 CAPSULE, DELAYED RELEASE ORAL at 08:33

## 2020-03-27 RX ADMIN — CEFTRIAXONE 1 G: 1 INJECTION, POWDER, FOR SOLUTION INTRAMUSCULAR; INTRAVENOUS at 12:32

## 2020-03-27 RX ADMIN — HYDROXYZINE HYDROCHLORIDE 50 MG: 25 TABLET, FILM COATED ORAL at 22:25

## 2020-03-27 RX ADMIN — HYDROXYZINE HYDROCHLORIDE 25 MG: 25 TABLET, FILM COATED ORAL at 02:11

## 2020-03-27 RX ADMIN — HYDROXYZINE HYDROCHLORIDE 25 MG: 25 TABLET, FILM COATED ORAL at 00:48

## 2020-03-27 RX ADMIN — POLYETHYLENE GLYCOL 3350 17 G: 17 POWDER, FOR SOLUTION ORAL at 08:39

## 2020-03-27 RX ADMIN — LIDOCAINE 1 PATCH: 560 PATCH PERCUTANEOUS; TOPICAL; TRANSDERMAL at 20:29

## 2020-03-27 RX ADMIN — HYDROXYZINE HYDROCHLORIDE 50 MG: 25 TABLET, FILM COATED ORAL at 08:39

## 2020-03-27 RX ADMIN — LOSARTAN POTASSIUM 50 MG: 25 TABLET, FILM COATED ORAL at 08:33

## 2020-03-27 RX ADMIN — AZITHROMYCIN MONOHYDRATE 500 MG: 500 INJECTION, POWDER, LYOPHILIZED, FOR SOLUTION INTRAVENOUS at 13:07

## 2020-03-27 RX ADMIN — DOCUSATE SODIUM 200 MG: 100 CAPSULE, LIQUID FILLED ORAL at 14:28

## 2020-03-27 ASSESSMENT — ACTIVITIES OF DAILY LIVING (ADL)
ADLS_ACUITY_SCORE: 11

## 2020-03-27 NOTE — PROGRESS NOTES
[facility]    Hospitalist Progress Note      Assessment & Plan   Evelyn Medrano is a 64 year old female who was admitted on 3/25/2020.    Summary of Stay:   Evelyn Medrano is a 64 year old female with a history of GERD, migraines and hypertension who is admitted to the hospitalist service after presenting with chest pain and being found to have CT findings concerning for coronavirus.  Initially she was also hypoxic.    COVID 19 testing came back negative.    At admission, she also complained of right lower chest pain/right upper quadrant abdominal pain.  Also noted to have point tenderness in the rib cage area.  Underwent extensive evaluation without any clear etiology.  Thought to be related to her underlying pulmonary infection.    Plan:    Right-sided pneumonia.  - Patient presented with symptoms of right lower chest pain, cough and hypoxia.  - CT chest Was negative for PE.  But it showed bilateral basal infiltrates more prominent on the right side  -Patient was started on IV ceftriaxone and IV azithromycin yesterday but because of unclear reasons both the medicines were held.  -Currently patient is not requiring any oxygen.  -Continues to complain of this right-sided lower chest pain with point tenderness.  -No fever or leukocytosis.  -I discussed at length with the patient regarding all the imaging findings and possible causes.  -Given low procalcitonin there is a possibility of her viral pneumonia (COVID 19 was negative)  -But we should treat this with IV antibiotics to cover community-acquired pneumonia.  -Monitor clinically.  No evidence of sepsis.  Blood cultures remains negative.    Right lower chest wall pain.  Present since admission.  -On exam, patient has point tenderness in the rib cage area.  -No skin rash to suggest herpes zoster.  -CT chest was unremarkable.  Right upper quadrant ultrasound was unremarkable.  -No evidence of cardiac ischemic cause.  Troponin negative.   Echocardiogram was unremarkable.  -Recommended treating the underlying pneumonia as above.  -Continue incentive spirometer.  Pain control with both simple pain medicines and opiate medicines if necessary so she can use the incentive spirometer effectively.    Hypertension  -Losartan.    DVT Prophylaxis: Pneumatic Compression Devices  Code Status: Full Code  Expected discharge: next 1-2 days    Sravan Arteaga MD  Text Page (7am - 6pm, M-F)    Interval History   Patient was evaluated with nursing staff. Overnight issues discussed.    Review of systems:  No nausea or vomiting. No diarrhea.  No palpitations.  No headache/visual disturbance/new weakness.  Continues to have the episodes of right lower chest wall pain.      -Data reviewed today: Labs and medications.    Physical Exam   Temp: 97.7  F (36.5  C) Temp src: Oral BP: (!) 153/67 Pulse: 73 Heart Rate: 70 Resp: 16 SpO2: 97 % O2 Device: None (Room air)    Vitals:    03/25/20 0026 03/25/20 0436   Weight: 95.3 kg (210 lb) 107.5 kg (237 lb)     Vital Signs with Ranges  Temp:  [97.7  F (36.5  C)-98.1  F (36.7  C)] 97.7  F (36.5  C)  Pulse:  [73] 73  Heart Rate:  [67-70] 70  Resp:  [16-20] 16  BP: (140-153)/(67-70) 153/67  SpO2:  [96 %-97 %] 97 %  I/O last 3 completed shifts:  In: 640 [P.O.:640]  Out: -     Constitutional: Awake, alert, cooperative, no apparent distress  HEENT: Trachea midline, sclera is clear   Respiratory: Clear to auscultation bilaterally, no crackles or wheezing  Point tenderness of the right lower chest wall with significant tenderness.  No nearby skin rash.  Cardiovascular: Regular rate and rhythm, normal S1 and S2, and no murmur noted  GI: Normal bowel sounds, soft, non-distended,   Skin/Integumen: No rashes, no cyanosis, no edema  Psych: appropriate affect, no agitation   Extremities: No pitting edema     Medications       azithromycin  500 mg Intravenous Q24H     cefTRIAXone  1 g Intravenous Q24H     docusate sodium  200 mg Oral Daily      lidocaine  1 patch Transdermal Q24h    And     lidocaine   Transdermal Q8H     losartan  50 mg Oral Daily     omeprazole  20 mg Oral Daily     sodium chloride (PF)  3 mL Intracatheter Q8H       Data   Recent Labs   Lab 03/27/20  0622 03/26/20  0724 03/25/20  0500 03/25/20  0110 03/25/20  0039   WBC 4.1 4.3  --  9.2  --    HGB 14.2 14.4  --  13.7  --    MCV 97 99  --  95  --     213  --  217  --    INR  --   --   --   --  0.91    140  --   --  136   POTASSIUM 3.8 3.9  --   --  3.6   CHLORIDE 110* 110*  --   --  103   CO2 25 28  --   --  26   BUN 11 8  --   --  15   CR 0.88 0.74  --   --  0.94   ANIONGAP 6 2*  --   --  7   DARREN 9.4 9.3  --   --  9.6   * 98  --   --  130*   ALBUMIN  --  3.2*  --   --  3.8   PROTTOTAL  --  7.3  --   --  8.1   BILITOTAL  --  0.8  --   --  1.0   ALKPHOS  --  83  --   --  97   ALT  --  52*  --   --  44   AST  --  40  --   --  28   LIPASE  --   --   --   --  147   TROPI  --   --  <0.015  --  <0.015       No results found for this or any previous visit (from the past 24 hour(s)).

## 2020-03-27 NOTE — PLAN OF CARE
End of Shift Summary  For vital signs and complete assessments, please see documentation flowsheets.     Pertinent assessments: pain in lower bilate chest gave atarax.   Treatment: Zithromax, rocephin  Major Shift Events: pain control

## 2020-03-27 NOTE — PLAN OF CARE
DX: abd, chest pain  Tele: na  A&O x4  Activity: Independent  Diet: Reg  VSS bp slightly elevated  O2: RA  BG: na  PIV: SL RA  Pain: abd pain oxy x1 and atarax x1  GI/: voiding appropriately  Labs: blood cultures show no growth, am labs pending  Plan: possible cxr today and start axb?  Discharge: Possibly home today, continue plan of care

## 2020-03-28 VITALS
DIASTOLIC BLOOD PRESSURE: 70 MMHG | RESPIRATION RATE: 16 BRPM | HEIGHT: 68 IN | TEMPERATURE: 98 F | SYSTOLIC BLOOD PRESSURE: 161 MMHG | BODY MASS INDEX: 35.92 KG/M2 | HEART RATE: 72 BPM | WEIGHT: 237 LBS | OXYGEN SATURATION: 98 %

## 2020-03-28 PROCEDURE — 25000132 ZZH RX MED GY IP 250 OP 250 PS 637: Performed by: INTERNAL MEDICINE

## 2020-03-28 PROCEDURE — 25000128 H RX IP 250 OP 636: Performed by: INTERNAL MEDICINE

## 2020-03-28 PROCEDURE — 99239 HOSP IP/OBS DSCHRG MGMT >30: CPT | Performed by: HOSPITALIST

## 2020-03-28 RX ORDER — CEFUROXIME AXETIL 500 MG/1
500 TABLET ORAL 2 TIMES DAILY
Qty: 12 TABLET | Refills: 0 | Status: SHIPPED | OUTPATIENT
Start: 2020-03-28 | End: 2020-04-15

## 2020-03-28 RX ORDER — AZITHROMYCIN 250 MG/1
250 TABLET, FILM COATED ORAL DAILY
Qty: 4 TABLET | Refills: 0 | Status: SHIPPED | OUTPATIENT
Start: 2020-03-28 | End: 2020-04-15

## 2020-03-28 RX ORDER — HYDROXYZINE HYDROCHLORIDE 25 MG/1
25 TABLET, FILM COATED ORAL 3 TIMES DAILY PRN
Qty: 20 TABLET | Refills: 0 | Status: SHIPPED | OUTPATIENT
Start: 2020-03-28 | End: 2020-04-15

## 2020-03-28 RX ORDER — LIDOCAINE 4 G/G
1 PATCH TOPICAL EVERY 24 HOURS
Qty: 10 PATCH | Refills: 1 | Status: SHIPPED | OUTPATIENT
Start: 2020-03-28 | End: 2020-04-15

## 2020-03-28 RX ADMIN — OMEPRAZOLE 20 MG: 20 CAPSULE, DELAYED RELEASE ORAL at 09:03

## 2020-03-28 RX ADMIN — LOSARTAN POTASSIUM 50 MG: 25 TABLET, FILM COATED ORAL at 09:03

## 2020-03-28 RX ADMIN — DOCUSATE SODIUM 200 MG: 100 CAPSULE, LIQUID FILLED ORAL at 09:02

## 2020-03-28 RX ADMIN — CEFTRIAXONE 1 G: 1 INJECTION, POWDER, FOR SOLUTION INTRAMUSCULAR; INTRAVENOUS at 09:50

## 2020-03-28 RX ADMIN — POLYETHYLENE GLYCOL 3350 17 G: 17 POWDER, FOR SOLUTION ORAL at 09:03

## 2020-03-28 ASSESSMENT — ACTIVITIES OF DAILY LIVING (ADL)
ADLS_ACUITY_SCORE: 11

## 2020-03-28 NOTE — PROGRESS NOTES
"Patient alert and oriented.  Up independently in room.  Reports bilateral upper rib area pain 4/10 declining intervention at this time.  Lung sounds clear.  Oxygen 95% on RA.  Bowel sounds hypoactive.  Has had bowel medications earlier today declines further intervention.  Encouraged to walk and increase of water intake.  Will continue to monitor.    BP (!) 162/72 (BP Location: Left arm)   Pulse 73   Temp 98.2  F (36.8  C) (Oral)   Resp 16   Ht 1.727 m (5' 8\")   Wt 107.5 kg (237 lb)   LMP 02/26/2003   SpO2 95%   BMI 36.04 kg/m      "

## 2020-03-28 NOTE — PROGRESS NOTES
Pt is alert and oriented, lung sounds clear, up independently. Tolerating regular diet,passing gas,hypoactive bowel sounds. Continues to complain of midsternum pain Lidocaine patch and Atarax  for pain.Voiding adequate amounts in the bathroom.Continues on abx Zithromax and Rocephin.Plan to discharge home today.

## 2020-03-28 NOTE — DISCHARGE SUMMARY
Hospitalist Discharge Summary  [facility]    Evelyn Seniorkk MRN# [id number]   YOB: 1955 Age: 64 year old     Date of Admission:  3/25/2020  Date of Discharge:  3/28/2020  Admitting Physician:  Howie Luna MD  Discharge Physician:  Randall Mittal DO  Discharging Service:  Hospitalist     Primary Provider: Brandy Leonardo          Discharge Diagnosis:   Right sided PNA  Pleuritic chest pain  HTN             Discharge Disposition:   Discharged to home           Allergies:   Allergies   Allergen Reactions     No Known Allergies               Discharge Medications:   Current Discharge Medication List      START taking these medications    Details   azithromycin (ZITHROMAX) 250 MG tablet Take 1 tablet (250 mg) by mouth daily for 4 days  Qty: 4 tablet, Refills: 0    Associated Diagnoses: Chest pain, unspecified type      cefuroxime (CEFTIN) 500 MG tablet Take 1 tablet (500 mg) by mouth 2 times daily  Qty: 12 tablet, Refills: 0    Associated Diagnoses: Chest pain, unspecified type      hydrOXYzine (ATARAX) 25 MG tablet Take 1 tablet (25 mg) by mouth 3 times daily as needed for itching or other (pain)  Qty: 20 tablet, Refills: 0    Associated Diagnoses: Chest pain, unspecified type      Lidocaine (LIDOCARE) 4 % Patch Place 1 patch onto the skin every 24 hours To prevent lidocaine toxicity, patient should be patch free for 12 hrs daily.  Qty: 10 patch, Refills: 1    Associated Diagnoses: Chest pain, unspecified type         CONTINUE these medications which have NOT CHANGED    Details   IMITREX 6 MG/0.5ML SC SOLN Inject 6 mg Subcutaneous every 12 hours as needed Once at onset of headache, then can repeat x1 for total of 2 doses in 24 hrs      losartan (COZAAR) 50 MG tablet Take 1 tablet (50 mg) by mouth daily  Qty: 30 tablet, Refills: 0    Comments: Ok per BJS  Associated Diagnoses: Benign essential hypertension      MULTIVITAMIN OR 1 qd      omeprazole (PRILOSEC) 20 MG  "CR capsule Take 1 capsule (20 mg) by mouth daily  Qty: 90 capsule, Refills: 1                    Condition on Discharge:   Discharge condition: Stable   Discharge vitals: Blood pressure (!) 161/70, pulse 72, temperature 98  F (36.7  C), temperature source Oral, resp. rate 16, height 1.727 m (5' 8\"), weight 107.5 kg (237 lb), last menstrual period 02/26/2003, SpO2 98 %, not currently breastfeeding.   Code status on discharge: Full Code      BASIC PHYSICAL EXAMINATION:  GENERAL: No apparent distress.  CARDIOVASCULAR: Regular rate and rhythm without murmurs.  PULMONARY: Clear to auscultation bilaterally.   GASTROINTESTINAL: Abdomen soft, non-tender.  EXTREMITIES: No edema, pulses intact.  NEUROLOGIC: No focal deficits.            History of Illness:   See detailed admission note for full details.               Procedures excluding imaging which is summarized below:   Please see details in the electronic medical record.           Consultations:   None          Significant Results:   Results for orders placed or performed during the hospital encounter of 03/25/20   CT Chest Pulmonary Embolism w Contrast    Narrative    EXAM: CT CHEST PULMONARY EMBOLISM W CONTRAST  LOCATION: [facility]  DATE/TIME: 3/25/2020 1:19 AM    INDICATION: Short of breath.    COMPARISON: None.    TECHNIQUE: CT angiogram chest during arterial phase injection IV contrast. 2D and 3D MIP reconstructions were performed by the CT technologist. Dose reduction techniques were used.    CONTRAST: 69 mL Isovue-370.    FINDINGS:  ANGIOGRAM CHEST: No visualized pulmonary embolism. No thoracic aortic aneurysm or dissection.    LUNGS AND PLEURA: Focal area of infiltrate or atelectasis right middle lobe with a small area of consolidation. Minimal patchy opacities at the dependent portion of both lungs, greater on the right, may be atelectasis or minimal infiltrate. Trace pleural   fluid on the right.    MEDIASTINUM/AXILLAE: No pathologic adenopathy. " Small hiatal hernia. Small opaque density within the hiatal hernia which may be a calcification or a portion of a tablet.    UPPER ABDOMEN: No acute findings.    MUSCULOSKELETAL: Normal.      Impression    IMPRESSION:  1.  No visualized pulmonary embolism.    2.  Small area of infiltrate or atelectasis right middle lobe.    3.  Mild dependent opacities bilaterally, greater on the right, may be atelectasis or minimal infiltrate. Trace right pleural fluid. Coronal varus related pneumonia cannot be excluded.    CRITICAL FINDING:  The finding of potential coronal virus related pneumonia was called to Echo, the patient's RN, by Dr. Jg Luna on 3/25/2020 2:00 AM.       US Abdomen Limited Portable    Narrative    ULTRASOUND ABDOMEN LIMITED 2020 2:09 PM     HISTORY: Abdominal pain.    COMPARISON: None.    FINDINGS: Liver is moderately fatty infiltrated as evidenced by a  diffuse increase in echogenicity without focal lesions. Gallbladder is  normal without stones or sludge. Extrahepatic bile duct is normal in  diameter. Pancreas is normal where visualized. Right kidney is normal  in size. There is no hydronephrosis. Proximal aorta and IVC are  nonaneurysmal.      Impression    IMPRESSION:   1. No cholelithiasis or cholecystitis.  2. At least moderate fatty infiltration of the liver.    ECHO KUMARI MD   Echocardiogram Complete    Narrative    [id number]  [id number]  [id number]  [id number]^CYNDY^EDITA           [facility]  Echocardiography Laboratory  [address] [place] [address]        Name: SALEEM CARVALHO  MRN: [id number]  : 1955  Study Date: 2020 01:55 PM  Age: 64 yrs  Gender: Female  Patient [facility]  Reason For Study: Chest Pain  Ordering Physician: EDITA NAYLOR  Referring Physician: Brandy Leonardo  Performed By: Neeta Salgado     BSA: 2.2 m2  Height: 68 in  Weight: 237 lb  HR: 73  BP: 157/60  mmHg  _____________________________________________________________________________  __        Procedure  Complete Portable Echo Adult. Optison (NDC #[id number]) given intravenously.  _____________________________________________________________________________  __        Interpretation Summary     1. Normal biventricular size and function. Left ventricular ejection fraction  of 55-60%. No segmental wall motion abnormalities noted.  2. Normal sized atria.  3. Valves not well seen on this technically difficult study.  4. Normal pulmonary artery pressure.  Compared to prior RUTHY on 2/8/2011, there is no significant change.  Technically adequate study.  _____________________________________________________________________________  __        Left Ventricle  The left ventricle is normal in size. Left ventricular systolic function is  normal. The visual ejection fraction is estimated at 55-60%. Grade I or early  diastolic dysfunction. No regional wall motion abnormalities noted.     Right Ventricle  The right ventricle is normal in size and function.     Atria  Normal left atrial size. Right atrial size is normal.     Mitral Valve  The mitral valve leaflets appear normal. There is no evidence of stenosis,  fluttering, or prolapse. There is trace mitral regurgitation.        Tricuspid Valve  Normal tricuspid valve. The right ventricular systolic pressure is  approximated at 23.7 mmHg plus the right atrial pressure.     Aortic Valve  The aortic valve is not well visualized.     Pulmonic Valve  The pulmonic valve is not well visualized.     Vessels  Normal size aorta. Inferior vena cava not well visualized for estimation of  right atrial pressure.     Pericardium  There is no pericardial effusion.        Rhythm  Sinus rhythm was noted.  _____________________________________________________________________________  __  MMode/2D Measurements & Calculations  IVSd: 1.2 cm     LVIDd: 4.7 cm  LVIDs: 2.4 cm  LVPWd: 1.1 cm  FS: 48.7  %  LV mass(C)d: 198.0 grams  LV mass(C)dI: 90.1 grams/m2  Ao root diam: 3.0 cm  LA dimension: 4.0 cm  asc Aorta Diam: 3.4 cm  LA/Ao: 1.3  LVOT diam: 2.1 cm  LVOT area: 3.5 cm2  LA Volume (BP): 47.0 ml  LA Volume Index (BP): 21.4 ml/m2  RWT: 0.49           Doppler Measurements & Calculations  MV E max juan: 82.1 cm/sec  MV A max juan: 94.1 cm/sec  MV E/A: 0.87  MV max P.0 mmHg  MV mean P.9 mmHg  MV V2 VTI: 31.7 cm  MVA(VTI): 3.0 cm2  MV P1/2t max juan: 107.2 cm/sec  MV P1/2t: 47.3 msec  MVA(P1/2t): 4.7 cm2  MV dec slope: 663.9 cm/sec2  MV dec time: 0.18 sec  LV V1 max P.7 mmHg  LV V1 max: 129.4 cm/sec  LV V1 VTI: 27.5 cm  SV(LVOT): 96.4 ml  SI(LVOT): 43.9 ml/m2  TR max juan: 243.2 cm/sec  TR max P.7 mmHg  E/E' av.4  Lateral E/e': 7.0  Medial E/e': 9.8              _____________________________________________________________________________  __        Report approved by: Tolu Rose 2020 03:01 PM            Transthoracic Echocardiogram Results:  No results found for this or any previous visit (from the past 4320 hour(s)).             Pending Results:   Unresulted Labs Ordered in the Past 30 Days of this Admission     Date and Time Order Name Status Description    3/25/2020 0322 Blood culture Preliminary     3/25/2020 0236 Blood culture Preliminary                       Discharge Instructions and Follow-Up:   Discharge instructions and follow-up:   Discharge Procedure Orders   Follow-up and recommended labs and tests    Order Comments: Follow up with primary care provider, Brandy Leonardo, within 7 days to evaluate medication change and for hospital follow- up.  The following labs/tests are recommended: Repeat CXR in 4-6 weeks to ensure infiltrate clears.     Activity   Order Comments: Your activity upon discharge: activity as tolerated     Order Specific Question Answer Comments   Is discharge order? Yes      Full Code     Order Specific Question Answer Comments   Code status  determined by: Discussion with patient/legal decision maker      Diet   Order Comments: Follow this diet upon discharge: Orders Placed This Encounter      Regular Diet Adult     Order Specific Question Answer Comments   Is discharge order? Yes              Hospital Course:   Evelyn Medrano is a 64 year old female with a history of GERD, migraines and hypertension who is admitted to the hospitalist service after presenting with chest pain and being found to have CT findings concerning for coronavirus.  Initially she was also hypoxic.  COVID 19 testing came back negative.  At admission, she also complained of right lower chest pain/right upper quadrant abdominal pain.  Also noted to have point tenderness in the rib cage area.  Underwent extensive evaluation without any clear etiology.  Thought to be related to her underlying pulmonary infection and pleuritic chest pain.  Was treated with antibiotics and clinically improving without hypoxia and fever.  Suitable for discharge home today.     Problem List:   1. Right-sided pneumonia.  -Patient presented with symptoms of right lower chest pain, cough and hypoxia.  - CT chest was negative for PE.  But it showed bilateral basal infiltrates more prominent on the right side.  -Patient was started on IV ceftriaxone and IV azithromycin.  -Currently patient is not requiring any oxygen.  -Continues to complain of this right-sided lower chest pain with point tenderness.  -No fever or leukocytosis.  -I discussed at length with the patient regarding all the imaging findings and possible causes.  -Given low procalcitonin there is a possibility of her viral pneumonia (COVID 19 was negative)  -No evidence of sepsis.  Blood cultures remains negative.     2. Right lower chest wall pain, suspect pleurisy.  -On exam, patient has point tenderness in the rib cage area.  -No skin rash to suggest herpes zoster.  -Lipase OK.  -CT chest was unremarkable.  Right upper quadrant ultrasound was  unremarkable.  -No evidence of cardiac ischemic cause.  Troponin negative.  Echocardiogram was unremarkable.  -Recommended treating the underlying pneumonia as above.  -Continue incentive spirometer.  Pain control with both simple pain medicines and opiate medicines if necessary so she can use the incentive spirometer effectively.     3. Hypertension.  -BP OK.  -Continue PTA Losartan.    The patient was seen, examined, and counseled on this day. The hospitalization and plan of care was reviewed with the patient extensively. All questions were addressed and the patient agreed to follow-up as noted above.      Total time spent in face to face contact with the patient and coordinating discharge was:  35 Minutes    Randall Mittal DO MPH  [facility] Hospitalist  [address] [place] [address]  Pager: [phone]  03/28/2020

## 2020-03-30 ENCOUNTER — CARE COORDINATION (OUTPATIENT)
Dept: FAMILY MEDICINE | Facility: CLINIC | Age: 65
End: 2020-03-30

## 2020-03-30 NOTE — PROGRESS NOTES
Care Coordination Assessment    PCP: Brandy Leonardo    Referral Source:  ED/IP List      Clinical Data: Patient discharged from inpatient at [facility] 03/28/2020 with pneumonia due to Coronavirus.  Patient discharged home with instructions to follow up with PCP in 7 days.      Plan: Patient has an appointment scheduled.  I will close encounter

## 2020-03-31 LAB
BACTERIA SPEC CULT: NO GROWTH
BACTERIA SPEC CULT: NO GROWTH
SPECIMEN SOURCE: NORMAL
SPECIMEN SOURCE: NORMAL

## 2020-04-15 ENCOUNTER — OFFICE VISIT (OUTPATIENT)
Dept: FAMILY MEDICINE | Facility: CLINIC | Age: 65
End: 2020-04-15

## 2020-04-15 VITALS
OXYGEN SATURATION: 98 % | SYSTOLIC BLOOD PRESSURE: 142 MMHG | TEMPERATURE: 97.9 F | WEIGHT: 235.6 LBS | DIASTOLIC BLOOD PRESSURE: 78 MMHG | HEIGHT: 68 IN | HEART RATE: 85 BPM | BODY MASS INDEX: 35.71 KG/M2

## 2020-04-15 DIAGNOSIS — K21.9 GASTROESOPHAGEAL REFLUX DISEASE, ESOPHAGITIS PRESENCE NOT SPECIFIED: ICD-10-CM

## 2020-04-15 DIAGNOSIS — R07.89 CHEST WALL PAIN: ICD-10-CM

## 2020-04-15 DIAGNOSIS — J18.9 PNEUMONIA OF RIGHT MIDDLE LOBE DUE TO INFECTIOUS ORGANISM: Primary | ICD-10-CM

## 2020-04-15 DIAGNOSIS — I10 BENIGN ESSENTIAL HYPERTENSION: ICD-10-CM

## 2020-04-15 DIAGNOSIS — G43.009 MIGRAINE WITHOUT AURA AND WITHOUT STATUS MIGRAINOSUS, NOT INTRACTABLE: ICD-10-CM

## 2020-04-15 PROCEDURE — 99214 OFFICE O/P EST MOD 30 MIN: CPT | Performed by: FAMILY MEDICINE

## 2020-04-15 RX ORDER — HYDROXYZINE HYDROCHLORIDE 25 MG/1
25 TABLET, FILM COATED ORAL 3 TIMES DAILY PRN
Qty: 20 TABLET | Refills: 0 | Status: SHIPPED | OUTPATIENT
Start: 2020-04-15 | End: 2020-05-06

## 2020-04-15 ASSESSMENT — MIFFLIN-ST. JEOR: SCORE: 1677.17

## 2020-04-15 NOTE — PROGRESS NOTES
Nolan Medrano is a 64 year old female who presents to clinic today for the following health issues:    HPI       Hospital Follow-up Visit:    Hospital/Nursing Home/IP Rehab Facility: [facility]  Date of Admission: 03/25/20  Date of Discharge: 03/28/20  Reason(s) for Admission: pneumonia and pluerisy            Problems taking medications regularly:  None       Medication changes since discharge: added atarax       Problems adhering to non-medication therapy:  None    Summary of hospitalization:  [facility] discharge summary reviewed  Diagnostic Tests/Treatments reviewed.  Follow up needed: CXR 4-6 weeks  Other Healthcare Providers Involved in Patient s Care:         None  Update since discharge: improved. Pt afebrile, eating, stooling, still c/o fatigue and some breathlessness    Post Discharge Medication Reconciliation: discharge medications reconciled, continue medications without change.  Plan of care communicated with patient     Coding guidelines for this visit:  Type of Medical   Decision Making Face-to-Face Visit       within 7 Days of discharge Face-to-Face Visit        within 14 days of discharge   Moderate Complexity 47502 [id number]   High Complexity 36296 85439                Patient Active Problem List   Diagnosis     Migraine without aura and without status migrainosus, not intractable     Choroidal Melanoma     Health Care Home     GERD (gastroesophageal reflux disease)     Obesity (BMI 35.0-39.9) with comorbidity (H)     Arthritis of knee     Chest pain     Past Surgical History:   Procedure Laterality Date     ARTHROSCOPY KNEE Right 2017    torn meniscus     C ANESTH,SURGERY OF SHOULDER  2005    frozen shoulder     C APPENDECTOMY  1977     C DESTRUC RETINAL JACOB MARTIN  2005    [place], followed at [place], also [facility] TOTAL KNEE ARTHROPLASTY Left 2010    meniscus tear     COLONOSCOPY      Normal repeat in 5 years     COLONOSCOPY  2/2013     internal hemorrhoids; repeat in 10 years     DESTRUC RETINAL ALTON MARTIN      right eye/ melanoma treatment     HC TOOTH EXTRACTION W/FORCEP  age 25     TONSILLECTOMY  1962     UPPER GI ENDOSCOPY  2011    normal       Social History     Tobacco Use     Smoking status: Never Smoker     Smokeless tobacco: Never Used   Substance Use Topics     Alcohol use: Yes     Alcohol/week: 1.7 standard drinks     Types: 2 Standard drinks or equivalent per week     Comment: rarely     Family History   Problem Relation Age of Onset     Neurologic Disorder Mother         migraines     Arthritis Mother 33        scleroderma - systemic     Neurologic Disorder Father          from MVA     Diabetes Brother      Diabetes Sister          Current Outpatient Medications   Medication Sig Dispense Refill     hydrOXYzine (ATARAX) 25 MG tablet Take 1 tablet (25 mg) by mouth 3 times daily as needed for itching or other (pain) 20 tablet 0     IMITREX 6 MG/0.5ML SC SOLN Inject 6 mg Subcutaneous every 12 hours as needed Once at onset of headache, then can repeat x1 for total of 2 doses in 24 hrs       losartan (COZAAR) 50 MG tablet Take 1 tablet (50 mg) by mouth daily 30 tablet 0     MULTIVITAMIN OR 1 qd       omeprazole (PRILOSEC) 20 MG CR capsule Take 1 capsule (20 mg) by mouth daily 90 capsule 1     Allergies   Allergen Reactions     No Known Allergies      Recent Labs   Lab Test 20  0622 20  0724 20  0039  04/15/19  1741 19  0853 17  1332 14  0914   A1C  --   --   --   --  5.5  --   --   --  5.6  --    LDL  --   --   --   --   --  94  --   --   --  112   HDL  --   --   --   --   --  79  --   --   --  72   TRIG  --   --   --   --   --  124  --   --   --  110   ALT  --  52* 44  --   --   --   --   --   --  24   CR 0.88 0.74 0.94   < >  --  0.87  --   --   --  1.08*   GFRESTIMATED 70 86 65   < >  --  72  --   --   --  57*   GFRESTBLACK 81 >90 76  --   --   --   --   --   --   --     POTASSIUM 3.8 3.9 3.6   < >  --  3.9  --   --   --  4.5   TSH  --   --   --   --   --  4.00 1.83   < >  --   --     < > = values in this interval not displayed.      BP Readings from Last 3 Encounters:   04/15/20 (!) 142/78   03/28/20 (!) 161/70   07/10/19 126/82    Wt Readings from Last 3 Encounters:   04/15/20 106.9 kg (235 lb 9.6 oz)   03/25/20 107.5 kg (237 lb)   07/10/19 104.3 kg (230 lb)                      Reviewed and updated as needed this visit by Provider         Review of Systems   ROS COMP: Constitutional, HEENT, cardiovascular, pulmonary, gi and gu systems are negative, except as otherwise noted.      Objective    LMP 02/26/2003   There is no height or weight on file to calculate BMI.  Physical Exam   GENERAL: healthy, alert and no distress  EYES: Eyes grossly normal to inspection, PERRL and conjunctivae and sclerae normal  HENT: ear canals and TM's normal, nose and mouth without ulcers or lesions  NECK: no adenopathy, no asymmetry, masses, or scars and thyroid normal to palpation  RESP: lungs clear to auscultation - no rales, rhonchi or wheezes  CV: regular rate and rhythm, normal S1 S2, no S3 or S4, no murmur, click or rub, no peripheral edema and peripheral pulses strong  ABDOMEN: soft, nontender, no hepatosplenomegaly, no masses and bowel sounds normal  MS: no gross musculoskeletal defects noted, no edema  NEURO: Normal strength and tone, mentation intact and speech normal  PSYCH: mentation appears normal, affect normal/bright    Diagnostic Test Results:  Labs reviewed in Epic        Assessment & Plan   Assessment      Plan  (J18.1) Pneumonia of right middle lobe due to infectious organism (H)  (primary encounter diagnosis)  Comment: resolving clinically- still fatigue and some breathing issues-discussed this is common after pneumonia- no signs anything worsening  Plan: rest, fluids, work on conditioning    (R07.89) Chest wall pain  Comment: improved, occasional atarax at night helping  Plan:  "    (I10) Benign essential hypertension  Comment: improved since hospital-still suboptimal  Plan: continue current medications at current doses     (K21.9) Gastroesophageal reflux disease, esophagitis presence not specified  Comment: stable  Plan: continue current medications at current doses     (G43.009) Migraine without aura and without status migrainosus, not intractable  Comment: stable  Plan: continue current medications at current doses prn    BMI:   Estimated body mass index is 35.82 kg/m  as calculated from the following:    Height as of this encounter: 1.727 m (5' 8\").    Weight as of this encounter: 106.9 kg (235 lb 9.6 oz).   Weight management plan: Discussed healthy diet and exercise guidelines            No follow-ups on file.    Vazquez Alvarado MD  [facility]            "

## 2020-05-05 DIAGNOSIS — R07.89 CHEST WALL PAIN: ICD-10-CM

## 2020-05-05 NOTE — TELEPHONE ENCOUNTER
Refilled 04/15/20. Should pt be out? Please advise    Evelyn Medrano is requesting a refill of:    Refused Prescriptions:                       Disp   Refills    hydrOXYzine (ATARAX) 25 MG tablet [Pharmac*20 tab*0        Sig: Take 1 tablet (25 mg) by mouth 3 times daily as           needed for itching or other (pain)

## 2020-05-06 RX ORDER — HYDROXYZINE HYDROCHLORIDE 25 MG/1
25 TABLET, FILM COATED ORAL 3 TIMES DAILY PRN
Qty: 20 TABLET | Refills: 0 | Status: SHIPPED | OUTPATIENT
Start: 2020-05-06 | End: 2020-07-08

## 2020-06-10 DIAGNOSIS — I10 BENIGN ESSENTIAL HYPERTENSION: ICD-10-CM

## 2020-06-10 RX ORDER — LOSARTAN POTASSIUM 50 MG/1
TABLET ORAL
Qty: 90 TABLET | Refills: 0 | Status: SHIPPED | OUTPATIENT
Start: 2020-06-10 | End: 2020-09-21

## 2020-06-10 NOTE — TELEPHONE ENCOUNTER
Evelyn Medrano is requesting a refill of:    Pending Prescriptions:                       Disp   Refills    losartan (COZAAR) 50 MG tablet [Pharmacy *90 tab*0            Sig: TAKE 1 TABLET BY MOUTH EVERY DAY    Pt was seen 4/15/20 for hospital F/U. When do you want to see Pt for HTN again?

## 2020-06-26 DIAGNOSIS — I10 BENIGN ESSENTIAL HYPERTENSION: ICD-10-CM

## 2020-06-26 RX ORDER — LOSARTAN POTASSIUM 50 MG/1
TABLET ORAL
Qty: 90 TABLET | Refills: 0 | COMMUNITY
Start: 2020-06-26

## 2020-06-26 NOTE — TELEPHONE ENCOUNTER
Evelyn Medrano is requesting a refill of:    Refused Prescriptions:                       Disp   Refills    losartan (COZAAR) 50 MG tablet [Pharmacy M*90 tab*0        Sig: TAKE 1 TABLET BY MOUTH EVERY DAY  Refused By: TATY GAMBLE  Reason for Refusal: Duplicate  Reason for Refusal Comment: Refilled on 06/10/20 for 90 tab

## 2020-07-07 ENCOUNTER — TRANSFERRED RECORDS (OUTPATIENT)
Dept: FAMILY MEDICINE | Facility: CLINIC | Age: 65
End: 2020-07-07

## 2020-07-08 ENCOUNTER — OFFICE VISIT (OUTPATIENT)
Dept: FAMILY MEDICINE | Facility: CLINIC | Age: 65
End: 2020-07-08

## 2020-07-08 VITALS
HEART RATE: 79 BPM | TEMPERATURE: 97.5 F | SYSTOLIC BLOOD PRESSURE: 152 MMHG | BODY MASS INDEX: 35.73 KG/M2 | WEIGHT: 235 LBS | OXYGEN SATURATION: 96 % | DIASTOLIC BLOOD PRESSURE: 80 MMHG

## 2020-07-08 DIAGNOSIS — Z98.890 S/P RIGHT KNEE SURGERY: ICD-10-CM

## 2020-07-08 DIAGNOSIS — J18.9 PNEUMONIA OF RIGHT MIDDLE LOBE DUE TO INFECTIOUS ORGANISM: Primary | ICD-10-CM

## 2020-07-08 LAB — ERYTHROCYTE [SEDIMENTATION RATE] IN BLOOD: 6 MM/HR (ref 0–20)

## 2020-07-08 PROCEDURE — 71046 X-RAY EXAM CHEST 2 VIEWS: CPT | Performed by: FAMILY MEDICINE

## 2020-07-08 PROCEDURE — 85651 RBC SED RATE NONAUTOMATED: CPT | Performed by: FAMILY MEDICINE

## 2020-07-08 PROCEDURE — 36415 COLL VENOUS BLD VENIPUNCTURE: CPT | Performed by: FAMILY MEDICINE

## 2020-07-08 PROCEDURE — 86140 C-REACTIVE PROTEIN: CPT | Mod: 90 | Performed by: FAMILY MEDICINE

## 2020-07-08 PROCEDURE — 99213 OFFICE O/P EST LOW 20 MIN: CPT | Performed by: FAMILY MEDICINE

## 2020-07-08 NOTE — PROGRESS NOTES
SUBJECTIVE:  Evelyn Medrano, a 64 year old female scheduled an appointment to discuss the following issues:     Pneumonia of right middle lobe due to infectious organism  S/P right knee surgery  Pt here for recheck pneumonia-was diagnosed through CT 3/25/20 at hospital- treated with abx and felt better.  Pt scheduled for recheck to make sure cleared.  She does note some slight cough and breathing changes in last week or so.  NO fevers    Pt also still has pain in right knee following TKA- was sent for labs to check ESR and CRP from [facility]  Medical, social, surgical, and family histories reviewed.    ROS:  CONSTITUTIONAL: NEGATIVE for fever, chills  EYES: NEGATIVE for vision changes   CV: NEGATIVE for chest pain, palpitations   GI: NEGATIVE for nausea, abdominal pain, heartburn, or change in bowel habits  : NEGATIVE for frequency, dysuria, or hematuria  MUSCULOSKELETAL: as above  NEURO: NEGATIVE for weakness, dizziness or paresthesias or headache    OBJECTIVE:  BP (!) 152/80 (BP Location: Right arm, Patient Position: Sitting, Cuff Size: Adult Large)   Pulse 79   Temp 97.5  F (36.4  C) (Temporal)   Wt 106.6 kg (235 lb)   LMP 02/26/2003   SpO2 96%   BMI 35.73 kg/m    EXAM:  GENERAL APPEARANCE: healthy, alert and no distress  EYES: EOMI,  PERRL  HENT: ear canals and TM's normal and nose and mouth without ulcers or lesions  RESP: lungs clear to auscultation - no rales, rhonchi or wheezes  CV: regular rates and rhythm, normal S1 S2, no S3 or S4 and no murmur, click or rub -  ABDOMEN:  soft, nontender, no HSM or masses and bowel sounds normal    CXR- normal per my review,   Will await radiology over-read and contact patient if any discrepancies     ASSESSMENT/PLAN:  (J18.9) Pneumonia of right middle lobe due to infectious organism  (primary encounter diagnosis)  Comment: resolved clinically, some clight cough and breathing changes today- vitals, sats CXR normal as is exam  Plan: HC XRAY CHEST, 2 VIEWS         Await radiolpogy read, f/u if not improving or worsening     (Z98.890) S/P right knee surgery  Comment: labs pending per surgeon  Plan: ESR, WESTERGREN ([facility]), C-Reactive Protein CRP         ([facility]), VENOUS COLLECTION

## 2020-07-08 NOTE — NURSING NOTE
Evelyn is here for a pneumonia f/u xray and labs.        Pre-visit Screening:  Immunizations:  up to date  Colonoscopy:  is up to date  Mammogram: is not up to date and to be scheduled  Asthma Action Test/Plan:  NA  PHQ9:  None  GAD7:  None  Questioned patient about current smoking habits Pt. has never smoked.  Ok to leave detailed message on voice mail for today's visit only Yes, phone # cell

## 2020-07-09 LAB — CRP SERPL-MCNC: 5.4 MG/L (ref 0–0.8)

## 2020-09-14 ENCOUNTER — MYC REFILL (OUTPATIENT)
Dept: FAMILY MEDICINE | Facility: CLINIC | Age: 65
End: 2020-09-14

## 2020-09-14 DIAGNOSIS — I10 BENIGN ESSENTIAL HYPERTENSION: ICD-10-CM

## 2020-09-14 RX ORDER — LOSARTAN POTASSIUM 50 MG/1
50 TABLET ORAL DAILY
Qty: 90 TABLET | Refills: 0 | Status: CANCELLED | OUTPATIENT
Start: 2020-09-14

## 2020-09-18 ENCOUNTER — NURSE TRIAGE (OUTPATIENT)
Dept: NURSING | Facility: CLINIC | Age: 65
End: 2020-09-18

## 2020-09-18 ENCOUNTER — HOSPITAL ENCOUNTER (EMERGENCY)
Facility: CLINIC | Age: 65
Discharge: HOME | End: 2020-09-18
Attending: INTERNAL MEDICINE | Admitting: INTERNAL MEDICINE
Payer: COMMERCIAL

## 2020-09-18 ENCOUNTER — APPOINTMENT (OUTPATIENT)
Dept: CT IMAGING | Facility: CLINIC | Age: 65
End: 2020-09-18
Attending: INTERNAL MEDICINE
Payer: COMMERCIAL

## 2020-09-18 VITALS
HEART RATE: 79 BPM | DIASTOLIC BLOOD PRESSURE: 99 MMHG | SYSTOLIC BLOOD PRESSURE: 164 MMHG | RESPIRATION RATE: 18 BRPM | TEMPERATURE: 97.8 F | OXYGEN SATURATION: 98 %

## 2020-09-18 DIAGNOSIS — S00.03XA HEMATOMA OF SCALP, INITIAL ENCOUNTER: ICD-10-CM

## 2020-09-18 DIAGNOSIS — S09.90XA INJURY OF HEAD, INITIAL ENCOUNTER: ICD-10-CM

## 2020-09-18 PROCEDURE — 70450 CT HEAD/BRAIN W/O DYE: CPT

## 2020-09-18 PROCEDURE — 25000132 ZZH RX MED GY IP 250 OP 250 PS 637: Performed by: INTERNAL MEDICINE

## 2020-09-18 PROCEDURE — 99284 EMERGENCY DEPT VISIT MOD MDM: CPT | Mod: 25

## 2020-09-18 RX ORDER — OXYCODONE AND ACETAMINOPHEN 5; 325 MG/1; MG/1
1-2 TABLET ORAL EVERY 4 HOURS PRN
Qty: 12 TABLET | Refills: 0 | Status: SHIPPED | OUTPATIENT
Start: 2020-09-18 | End: 2020-10-26

## 2020-09-18 RX ORDER — OXYCODONE AND ACETAMINOPHEN 5; 325 MG/1; MG/1
1 TABLET ORAL ONCE
Status: COMPLETED | OUTPATIENT
Start: 2020-09-18 | End: 2020-09-18

## 2020-09-18 RX ADMIN — OXYCODONE HYDROCHLORIDE AND ACETAMINOPHEN 1 TABLET: 5; 325 TABLET ORAL at 21:14

## 2020-09-18 ASSESSMENT — ENCOUNTER SYMPTOMS
ROS SKIN COMMENTS: SWELLING
HEADACHES: 1

## 2020-09-18 NOTE — ED AVS SNAPSHOT
[facility]  [address]  [facility] [address]  Phone:  [phone]  Fax:  [phone]                                    Evelyn Medrano   MRN: [id number]    Department:  [facility]   Date of Visit:  9/18/2020           After Visit Summary Signature Page    I have received my discharge instructions, and my questions have been answered. I have discussed any challenges I see with this plan with the nurse or doctor.    ..........................................................................................................................................  Patient/Patient Representative Signature      ..........................................................................................................................................  Patient Representative Print Name and Relationship to Patient    ..................................................               ................................................  Date                                   Time    ..........................................................................................................................................  Reviewed by Signature/Title    ...................................................              ..............................................  Date                                               Time          22EPIC Rev 08/18

## 2020-09-19 NOTE — TELEPHONE ENCOUNTER
Caller was the spouse (telephone consent taken), called for wife that fell this evening  States that patient has a bump at the back of her head that is bigger the size of an egg with pain.  Caller denied bleeding, change in level of orientation or open area.  Usha Lind RN  COVID 19 Nurse Triage Plan/Patient Instructions    Please be aware that novel coronavirus (COVID-19) may be circulating in the community. If you develop symptoms such as fever, cough, or SOB or if you have concerns about the presence of another infection including coronavirus (COVID-19), please contact your health care provider or visit [email or web address].     Disposition/Instructions    ED Visit recommended. Follow protocol based instructions.     Bring Your Own Device:  Please also bring your smart device(s) (smart phones, tablets, laptops) and their charging cables for your personal use and to communicate with your care team during your visit.    Thank you for taking steps to prevent the spread of this virus.  o Limit your contact with others.  o Wear a simple mask to cover your cough.  o Wash your hands well and often.    Resources    [facility]: About COVID-19: [email or web address]    CDC: What to Do If You're Sick: www.cdc.gov/coronavirus/2019-ncov/about/steps-when-sick.html    CDC: Ending Home Isolation: www.cdc.gov/coronavirus/2019-ncov/hcp/disposition-in-home-patients.html     CDC: Caring for Someone: www.cdc.gov/coronavirus/2019-ncov/if-you-are-sick/care-for-someone.html     [facility]: Interim Guidance for Hospital Discharge to Home: www.health[facility]/diseases/coronavirus/hcp/hospdischarge.pdf    [facility] clinical trials (COVID-19 research studies): clinicalaffairs.[email or web address].edu/um-clinical-trials     Below are the COVID-19 hotlines at the [facility] ([facility]). Interpreters are available.   o For health questions: Call 469-520-0459 or 1-389.512.9429 (7 a.m. to 7 p.m.)  o For questions about  "schools and childcare: Call 900-822-1460 or 1-987.814.3065 (7 a.m. to 7 p.m.)                     Reason for Disposition    Large swelling or bruise > 2 inches (5 cm)    Additional Information    Negative: [1] ACUTE NEURO SYMPTOM AND [2] present now  (DEFINITION: difficult to awaken OR confused thinking and talking OR slurred speech OR weakness of arms OR unsteady walking)    Negative: Knocked out (unconscious) > 1 minute    Negative: Seizure (convulsion) occurred  (Exception: prior history of seizures and now alert and without Acute Neuro Symptoms)    Negative: Penetrating head injury (e.g., knife, gun shot wound, metal object)    Negative: [1] Major bleeding (e.g., actively dripping or spurting) AND [2] can't be stopped    Negative: [1] Dangerous mechanism of injury (e.g., MVA, diving, trampoline, contact sports, fall > 10 feet or 3 meters) AND [2] NECK pain AND [3] began < 1 hour after injury    Negative: Sounds like a life-threatening emergency to the triager    Negative: [1] Recently examined and diagnosed with a concussion by a healthcare provider AND [2] questions about concussion symptoms    Negative: Can't remember what happened (amnesia)    Negative: Vomiting once or more    Negative: [1] Loss of vision or double vision AND [2] present now    Negative: Watery or blood-tinged fluid dripping from the NOSE or EARS now  (Exception: tears from crying or nosebleed from nasal trauma)    Negative: One or two \"black eyes\" (bruising, purple color of eyelids)    Protocols used: HEAD INJURY-A-AH      "

## 2020-09-19 NOTE — ED PROVIDER NOTES
"  History     Chief Complaint:  Fall    The history is provided by the patient and the spouse.      Evelyn Medrano is a 65 year old female who presents after a fall. The patient states that she was out celebrating her birthday, when she came home and had a mechanical fall, slipping down an estimate of eight steps at 1700. She is unsure the mechanism of the fall, but her  notes she \"had a few drinks\" while out to dinner. The patient waited to come to the emergency department, but when taking off her makeup, noticed a left forehead posterior hematoma, prompting her visit. She additionally complains of a headache, but denies any loss of consciousness, vision changes, or other injuries from the fall.      Allergies:  No known drug allergies    Medications:    Lisinopril   Prilosec   Cozaar     Past Medical History:    GERD  Arthritis   Melanoma   Diffuse cystic mastopathy   Variants of migraine     Past Surgical History:    Arthroscopy knee   Shoulder surgery frozen   Appendectomy   Destruction retinal   Colonoscopy   Destruc retinal lesn, photocoag   Tonsillectomy   Upper GI endoscopy     Family History:    Mother:  Migraines, arthritis   Father: MVA   Brother: diabetes   Sister: diabetes     Social History:  Smoking status: no  Alcohol use: yes  Drug use: no  The patient presents to the emergency department with spouse   PCP: Vazquez Alvarado  Marital Status:   [2]    Review of Systems   Eyes: Negative for visual disturbance.   Skin:        Swelling   Neurological: Positive for headaches. Negative for syncope.   All other systems reviewed and are negative.      Physical Exam     Patient Vitals for the past 24 hrs:   BP Temp Temp src Pulse Resp SpO2   09/18/20 2102 (!) 164/99 97.8  F (36.6  C) Oral 79 18 98 %       Physical Exam  Constitutional:       Comments: Tearful, cooperative   HENT:      Head:      Comments: Large soft posterior scalp hematoma.  I am unable to feel the skull through " this.     Right Ear: Tympanic membrane normal.      Left Ear: Tympanic membrane normal.      Mouth/Throat:      Pharynx: No posterior oropharyngeal erythema.   Eyes:      Conjunctiva/sclera: Conjunctivae normal.   Neck:      Musculoskeletal: Neck supple.   Cardiovascular:      Rate and Rhythm: Normal rate and regular rhythm.      Heart sounds: Normal heart sounds.   Pulmonary:      Effort: Pulmonary effort is normal.      Breath sounds: Normal breath sounds.   Abdominal:      General: Bowel sounds are normal. There is no distension.      Palpations: Abdomen is soft.      Tenderness: There is no abdominal tenderness. There is no guarding or rebound.   Musculoskeletal: Normal range of motion.   Skin:     General: Skin is warm and dry.   Neurological:      Mental Status: She is alert.         Emergency Department Course   Imaging:  Radiology findings were communicated with the patient and family who voiced understanding of the findings.    CT Head w/o Contrast  IMPRESSION:  1.  No evidence of acute intracranial hemorrhage.  2.  No evidence of acute calvarial fracture.  3.  Left parietal scalp hematoma.  As read by Radiology.    Interventions:  2114 Percocet 1 tablet PO     Emergency Department Course:  Past medical records, nursing notes, and vitals reviewed.  2107: I performed an exam of the patient and obtained history, as documented above.     The patient was sent for a head CT while in the emergency department, results above.     I rechecked the patient. I reviewed the results with the Patient and spouse and answered all related questions prior to discharge.     Findings and plan explained to the Patient. Patient discharged home with instructions regarding supportive care, medications, and reasons to return. The importance of close follow-up was reviewed. The patient was prescribed Percocet.   Impression & Plan   Medical Decision Making:    Evelyn Medrano is a 65 year old female who presents to the emergency  department after a fall down the stairs with head injury.  She does have a large scalp hematoma but fortunately CT shows no evidence of a skull fracture or intracranial injury.  No other evidence of injury from the fall.  Discussed head injury and concussion, need for rest and symptomatic management.  I will have her see her primary physician in about 3 days in follow-up.  I have prescribed Percocet for pain, return if problems.    Diagnosis:    ICD-10-CM    1. Injury of head, initial encounter  S09.90XA    2. Hematoma of scalp, initial encounter  S00.03XA        Disposition:  Discharged to home.    Discharge Medications:  New Prescriptions    OXYCODONE-ACETAMINOPHEN (PERCOCET) 5-325 MG TABLET    Take 1-2 tablets by mouth every 4 hours as needed for pain     Paula Moody  9/18/2020   [facility] EMERGENCY DEPARTMENT  Scribe Disclosure:  I, Paula Moody, am serving as a scribe at 9:07 PM on 9/18/2020 to document services personally performed by Yazmin Knight MD based on my observations and the provider's statements to me.        Yazmin Knight MD  09/18/20 1376

## 2020-09-19 NOTE — DISCHARGE INSTRUCTIONS
Discharge Instructions  Head Injury    You have been seen today for a head injury. You were checked for serious problems, like bleeding on the brain, but these problems cannot always be found right away.  Due to this risk, you should not be alone for 24 hours after your injury.  Follow up with your regular physician in 3 days. If you are taking a blood thinner, such as aspirin, Pradaxa  (dabigatran), Coumadin  (warfarin), or Plavix  (clopidogrel), you are at especially high risk for immediate or delayed bleeding, and need to re-check with a physician in 24 hours, or sooner if any of the symptoms below happen.     Return to the Emergency Department if:  You are confused, have amnesia, or you are not acting right.  Your headache gets worse or you start to have a really bad headache even with your recommended treatment plan.  You vomit more than once.  You have a convulsion or seizure.  You have trouble walking.  You have weakness or paralysis in an arm or a leg.  You have blood or fluid coming from your ears or nose.  You have new symptoms or anything that worries you.    Sleeping:  It is okay for you to sleep, but someone should wake you up as instructed by your doctor, and someone should check on you at your usual time to wake up.     Activity:  Do not drive for at least 24 hours.  Do not drive if you have dizzy spells or trouble concentrating, or remembering things.  Do not return to any contact sports until cleared by your regular doctor.     Follow-up:  It is very important that you make an appointment with your clinic and go to the appointment.  If you do not follow-up with your regular doctor, it may result in missing an important development which could result in permanent injury or disability and/or lasting pain.  If there is any problem keeping your appointment, call your doctor or return to the Emergency Department.    MORE INFORMATION:    Concussion:  A concussion is a minor head injury that may cause  temporary problems with the way your brain works.  Some symptoms include:  confusion, amnesia, nausea and vomiting, dizziness, fatigue, memory or concentration problems, irritability and sleep problems.    CT Scans: Your evaluation today may have included a CT scan (CAT scan) to look for things like bleeding or a skull fracture (break).  CT scans involve radiation and too many CT scans can cause serious health problems like cancer, especially in children.  Because of this, your doctor may not have ordered a CT scan today if they think you are at low risk for a serious or life threatening problem.    If you were given a prescription for medicine here today, be sure to read all of the information (including the package insert) that comes with your prescription.  This will include important information about the medicine, its side effects, and any warnings that you need to know about.  The pharmacist who fills the prescription can provide more information and answer questions you may have about the medicine.  If you have questions or concerns that the pharmacist cannot address, please call or return to the Emergency Department.     Opioid Medication Information    Pain medications are among the most commonly prescribed medicines, so we are including this information for all our patients. If you did not receive pain medication or get a prescription for pain medicine, you can ignore it.     You may have been given a prescription for an opioid (narcotic) pain medicine and/or have received a pain medicine while here in the Emergency Department. These medicines can make you drowsy or impaired. You must not drive, operate dangerous equipment, or engage in any other dangerous activities while taking these medications. If you drive while taking these medications, you could be arrested for DUI, or driving under the influence. Do not drink any alcohol while you are taking these medications.     Opioid pain medications can cause  addiction. If you have a history of chemical dependency of any type, you are at a higher risk of becoming addicted to pain medications.  Only take these prescribed medications to treat your pain when all other options have been tried. Take it for as short a time and as few doses as possible. Store your pain pills in a secure place, as they are frequently stolen and provide a dangerous opportunity for children or visitors in your house to start abusing these powerful medications. We will not replace any lost or stolen medicine.  As soon as your pain is better, you should flush all your remaining medication.     Many prescription pain medications contain Tylenol  (acetaminophen), including Vicodin , Tylenol #3 , Norco , Lortab , and Percocet .  You should not take any extra pills of Tylenol  if you are using these prescription medications or you can get very sick.  Do not ever take more than 3000 mg of acetaminophen in any 24 hour period.    All opioids tend to cause constipation. Drink plenty of water and eat foods that have a lot of fiber, such as fruits, vegetables, prune juice, apple juice and high fiber cereal.  Take a laxative if you don t move your bowels at least every other day. Miralax , Milk of Magnesia, Colace , or Senna  can be used to keep you regular.      Remember that you can always come back to the Emergency Department if you are not able to see your regular doctor in the amount of time listed above, if you get any new symptoms, or if there is anything that worries you.

## 2020-09-19 NOTE — ED TRIAGE NOTES
Fell down 8 stairs several hours ago.  Headache and posterior hematoma.  Pt crying and anxious in triage.

## 2020-09-21 ENCOUNTER — OFFICE VISIT (OUTPATIENT)
Dept: FAMILY MEDICINE | Facility: CLINIC | Age: 65
End: 2020-09-21

## 2020-09-21 VITALS
OXYGEN SATURATION: 97 % | SYSTOLIC BLOOD PRESSURE: 140 MMHG | DIASTOLIC BLOOD PRESSURE: 70 MMHG | HEIGHT: 68 IN | WEIGHT: 236 LBS | BODY MASS INDEX: 35.77 KG/M2 | HEART RATE: 86 BPM | TEMPERATURE: 98.4 F

## 2020-09-21 DIAGNOSIS — S00.03XD HEMATOMA OF SCALP, SUBSEQUENT ENCOUNTER: ICD-10-CM

## 2020-09-21 DIAGNOSIS — G43.009 MIGRAINE WITHOUT AURA AND WITHOUT STATUS MIGRAINOSUS, NOT INTRACTABLE: ICD-10-CM

## 2020-09-21 DIAGNOSIS — R73.09 ELEVATED GLUCOSE: ICD-10-CM

## 2020-09-21 DIAGNOSIS — I10 BENIGN ESSENTIAL HYPERTENSION: Primary | ICD-10-CM

## 2020-09-21 DIAGNOSIS — S09.90XD INJURY OF HEAD, SUBSEQUENT ENCOUNTER: ICD-10-CM

## 2020-09-21 LAB
BUN SERPL-MCNC: 12 MG/DL (ref 7–25)
BUN/CREATININE RATIO: 11.8 (ref 6–22)
CALCIUM SERPL-MCNC: 10 MG/DL (ref 8.6–10.3)
CHLORIDE SERPLBLD-SCNC: 109.3 MMOL/L (ref 98–110)
CO2 SERPL-SCNC: 24.9 MMOL/L (ref 20–32)
CREAT SERPL-MCNC: 1.02 MG/DL (ref 0.7–1.18)
GLUCOSE SERPL-MCNC: 164 MG/DL (ref 60–99)
HBA1C MFR BLD: 5.3 % (ref 4–7)
POTASSIUM SERPL-SCNC: 3.85 MMOL/L (ref 3.5–5.3)
SODIUM SERPL-SCNC: 144.4 MMOL/L (ref 135–146)

## 2020-09-21 PROCEDURE — 80048 BASIC METABOLIC PNL TOTAL CA: CPT | Performed by: FAMILY MEDICINE

## 2020-09-21 PROCEDURE — 99214 OFFICE O/P EST MOD 30 MIN: CPT | Mod: 25 | Performed by: FAMILY MEDICINE

## 2020-09-21 PROCEDURE — 90471 IMMUNIZATION ADMIN: CPT | Performed by: FAMILY MEDICINE

## 2020-09-21 PROCEDURE — 90686 IIV4 VACC NO PRSV 0.5 ML IM: CPT | Performed by: FAMILY MEDICINE

## 2020-09-21 PROCEDURE — 36415 COLL VENOUS BLD VENIPUNCTURE: CPT | Performed by: FAMILY MEDICINE

## 2020-09-21 PROCEDURE — 83036 HEMOGLOBIN GLYCOSYLATED A1C: CPT | Performed by: FAMILY MEDICINE

## 2020-09-21 RX ORDER — LOSARTAN POTASSIUM 50 MG/1
50 TABLET ORAL DAILY
Qty: 90 TABLET | Refills: 3 | Status: SHIPPED | OUTPATIENT
Start: 2020-09-21 | End: 2021-10-04

## 2020-09-21 RX ORDER — LOSARTAN POTASSIUM 50 MG/1
50 TABLET ORAL DAILY
Qty: 90 TABLET | Refills: 1 | Status: SHIPPED | OUTPATIENT
Start: 2020-09-21 | End: 2020-09-21

## 2020-09-21 ASSESSMENT — MIFFLIN-ST. JEOR: SCORE: 1673.99

## 2020-09-21 NOTE — PROGRESS NOTES
SUBJECTIVE:                                                      Patient presents for Hospital /ED Followup Visit:    Hospital:  [facility]      Date of Admission: 9/18/2020  Date of Discharge: 9/18/2020  Transitional Care Management Phone Call:   Reason(s) for Admission: fell and hit head-bruised but feels ok, not using much of pain pills, achy all over            Problems taking medications regularly:  None       Medication changes since discharge: None       Problems adhering to non-medication therapy:  None    Summary of hospitalization:  [facility] discharge summary reviewed  Diagnostic Tests/Treatments reviewed.  Follow up needed: none  Other Healthcare Providers Involved in Patient s Care:         None  Update since discharge: stable.         ROS:  Constitutional, HEENT, cardiovascular, pulmonary, gi and gu systems are negative, except as otherwise noted.    OBJECTIVE:                                                      GENERAL APPEARANCE: healthy, alert and no distress  EYES: Eyes grossly normal to inspection, PERRL and conjunctivae and sclerae normal  HENT: ear canals and TM's normal and nose and mouth without ulcers or lesions  NECK: no adenopathy, no asymmetry, masses, or scars and thyroid normal to palpation  RESP: lungs clear to auscultation - no rales, rhonchi or wheezes  CV: regular rates and rhythm, normal S1 S2, no S3 or S4 and no murmur, click or rub  ABDOMEN: soft, nontender, without hepatosplenomegaly or masses and bowel sounds normal  MS: extremities normal- no gross deformities noted  SKIN: no suspicious lesions or rashes and ecchymoses - left buttock, right wrist, left posterior scalp  NEURO: Normal strength and tone, mentation intact and speech normal  PSYCH: mentation appears normal and affect normal/bright    ASSESSMENT/PLAN:                                                      (I10) Benign essential hypertension  (primary encounter diagnosis)  Comment: borderline  today but well controlled at home  Plan: losartan (COZAAR) 50 MG tablet, Basic Metabolic        Panel (BFP), VENOUS COLLECTION        continue current medications at current doses     (G43.009) Migraine without aura and without status migrainosus, not intractable-[facility]  Comment: stable symptomatically   Plan: continue current medications at current doses     (S09.90XD) Injury of head, subsequent encounter  Comment: stable to improved  Plan:     (S00.03XD) Hematoma of scalp, subsequent encounter  Comment: stable  Plan:     Recheck 1 yr        Post Discharge Medication Reconciliation: discharge medications reconciled and changed, per note/orders.  Issues to address: No issues identified.  Plan of care communicated with patient      Type of Medical Decision Making Face-to-Face Visit within 7 Days Face-to-Face Visit within 14 days   Moderate Complexity [id number]   High Complexity [id number]

## 2020-09-21 NOTE — NURSING NOTE
Evelyn is here for a med check and hospital follow up.    Pre-Visit Screening:  Immunizations:Flu shot  Colonoscopy:UTD  Mammogram:needs  Asthma Action Test/Plan:NA  PHQ9:NA  GAD7:NA  Questioned patient about current smoking habits Pt.never smoked  OK to leave a detailed message on voice mail for today's visit yes, phone # [phone]

## 2020-10-26 ENCOUNTER — OFFICE VISIT (OUTPATIENT)
Dept: FAMILY MEDICINE | Facility: CLINIC | Age: 65
End: 2020-10-26

## 2020-10-26 VITALS
DIASTOLIC BLOOD PRESSURE: 70 MMHG | WEIGHT: 236 LBS | SYSTOLIC BLOOD PRESSURE: 140 MMHG | HEIGHT: 68 IN | OXYGEN SATURATION: 98 % | HEART RATE: 79 BPM | BODY MASS INDEX: 35.77 KG/M2 | TEMPERATURE: 98 F

## 2020-10-26 DIAGNOSIS — Z96.651 HISTORY OF RIGHT KNEE JOINT REPLACEMENT: ICD-10-CM

## 2020-10-26 DIAGNOSIS — K21.9 GASTROESOPHAGEAL REFLUX DISEASE, UNSPECIFIED WHETHER ESOPHAGITIS PRESENT: ICD-10-CM

## 2020-10-26 DIAGNOSIS — E66.01 MORBID OBESITY (H): ICD-10-CM

## 2020-10-26 DIAGNOSIS — H26.9 CATARACT OF LEFT EYE, UNSPECIFIED CATARACT TYPE: Primary | ICD-10-CM

## 2020-10-26 DIAGNOSIS — I10 BENIGN ESSENTIAL HYPERTENSION: ICD-10-CM

## 2020-10-26 LAB
% GRANULOCYTES: 57.9 %
HCT VFR BLD AUTO: 50.5 % (ref 35–47)
HEMOGLOBIN: 16.5 G/DL (ref 11.7–15.7)
LYMPHOCYTES NFR BLD AUTO: 30.8 %
MCH RBC QN AUTO: 32.1 PG (ref 26–33)
MCHC RBC AUTO-ENTMCNC: 32.7 G/DL (ref 31–36)
MCV RBC AUTO: 98.3 FL (ref 78–100)
MONOCYTES NFR BLD AUTO: 11.3 %
PLATELET COUNT - QUEST: 163 10^9/L (ref 150–375)
RBC # BLD AUTO: 5.14 10*12/L (ref 3.8–5.2)
WBC # BLD AUTO: 7.6 10*9/L (ref 4–11)

## 2020-10-26 PROCEDURE — 99213 OFFICE O/P EST LOW 20 MIN: CPT | Performed by: PHYSICIAN ASSISTANT

## 2020-10-26 PROCEDURE — 85025 COMPLETE CBC W/AUTO DIFF WBC: CPT | Performed by: PHYSICIAN ASSISTANT

## 2020-10-26 PROCEDURE — 36415 COLL VENOUS BLD VENIPUNCTURE: CPT | Performed by: PHYSICIAN ASSISTANT

## 2020-10-26 RX ORDER — MIRTAZAPINE 15 MG/1
TABLET, FILM COATED ORAL
COMMUNITY
Start: 2020-10-12 | End: 2024-03-19

## 2020-10-26 ASSESSMENT — MIFFLIN-ST. JEOR: SCORE: 1666.05

## 2020-10-26 NOTE — LETTER
[facility] PHYSICIANS  [address]  [address]  [facility] [address]  [phone]  Dept: [phone]    PRE-OP EVALUATION:  Today's date: 10/26/2020    Evelyn Medrano (: 1955) presents for pre-operative evaluation assessment as requested by Dr. Wray.  She requires evaluation and anesthesia risk assessment prior to undergoing surgery/procedure for treatment of left cataract.    Proposed Surgery/ Procedure: cataract surgery  Date of Surgery/ Procedure: 11/3/2020  Time of Surgery/ Procedure:   Hospital/Surgical Facility: [facility] [place]  Surgery Fax Number: [phone]  Primary Physician: Vazquez Alvarado  Type of Anesthesia Anticipated: to be determined    Preoperative Questionnaire:   No - Have you ever had a heart attack or stroke?  No - Have you ever had surgery on your heart or blood vessels, such as a stent, coronary (heart) bypass, or surgery on an artery in the head, neck, heart, or legs?  No - Do you have chest pain when you are physically active?  No - Do you have a history of heart failure?  No - Do you currently have a cold, bronchitis, or symptoms of other respiratory (head and chest) infections?  No - Do you have a cough, shortness of breath, or wheezing?  No - Do you or anyone in your family have a history of blood clots?  No - Do you or anyone in your family have a serious bleeding problem, such as long-lasting bleeding after surgeries or cuts?  No - Have you ever had anemia or been told to take iron pills?  No - Have you had any abnormal blood loss such as black, tarry or bloody stools, or abnormal vaginal bleeding?  No - Have you ever had a blood transfusion?  Yes - Are you willing to have a blood transfusion if it is medically needed before, during, or after your surgery?  No - Have you or anyone in your family ever had problems with anesthesia (sedation for surgery)?  No - Do you have sleep apnea, excessive snoring, or daytime drowsiness?   No - Do you  have any artifical heart valves or other implanted medical devices, such as a pacemaker, defibrillator, or continuous glucose monitor?  YES - DO YOU HAVE ANY ARTIFICIAL JOINTS? R TKR in 7/2019  No - Are you allergic to latex?  No - Is there any chance that you may be pregnant?    Patient has a Health Care Directive or Living Will:  YES    HPI:     HPI related to upcoming procedure: Evelyn has been having worsening symptoms of cataracts in left eye.     HYPERTENSION - Patient has longstanding history of HTN , currently denies any symptoms referable to elevated blood pressure. Specifically denies chest pain, palpitations, dyspnea, orthopnea, PND or peripheral edema. Blood pressure readings have been in normal range. Current medication regimen is as listed below. Patient denies any side effects of medication.     MEDICAL HISTORY:     Patient Active Problem List    Diagnosis Date Noted     Choroidal Melanoma 09/16/2002     Priority: High     Follwwed by LYDIA Medeiros at [place]    Initially treated with TTT X3, and then proton beam radiation in April 2006       History of right knee joint replacement 10/26/2020     Priority: Medium     Chest pain 03/25/2020     Priority: Medium     Arthritis of knee 07/10/2019     Priority: Medium     Obesity (BMI 35.0-39.9) with comorbidity (H) 11/17/2018     Priority: Medium     GERD (gastroesophageal reflux disease) 12/06/2014     Priority: Medium     Health Care Home 02/22/2013     Priority: Medium     State Tier Level:  Tier 0  Status:  n/a  Care Coordinator:  n/a     See Letters for [facility] Care Plan           Migraine without aura and without status migrainosus, not intractable 03/22/2000     Priority: Medium      Past Medical History:   Diagnosis Date     Choroidal Melanoma 9/16/2002    Follwwed by LYDIA Medeiros at [place]   Initially treated with TTT X3, and then proton beam radiation in April 2006     Diffuse cystic mastopathy 8/14/2001     Variants of migraine, not elsewhere classified       Past Surgical History:   Procedure Laterality Date     ARTHROSCOPY KNEE Right 2017    torn meniscus     AS TOTAL KNEE ARTHROPLASTY Right 07/29/2019     C ANESTH,SURGERY OF SHOULDER  2005    frozen shoulder     C APPENDECTOMY  1977     C DESTRUC RETINAL JACOB MARTIN  2005    [place], followed at [place], also [facility] TOTAL KNEE ARTHROPLASTY Left 2010    meniscus tear     COLONOSCOPY  12/2011    Normal repeat in 5 years     COLONOSCOPY  02/2013    internal hemorrhoids; repeat in 10 years     DESTRUC RETINAL LESN,PHOTOCOAG  2003    right eye/ melanoma treatment     HC TOOTH EXTRACTION W/FORCEP  age 25     TONSILLECTOMY  1962     UPPER GI ENDOSCOPY  12/2011    normal     Current Outpatient Medications   Medication Sig Dispense Refill     IMITREX 6 MG/0.5ML SC SOLN Inject 6 mg Subcutaneous every 12 hours as needed Once at onset of headache, then can repeat x1 for total of 2 doses in 24 hrs       losartan (COZAAR) 50 MG tablet Take 1 tablet (50 mg) by mouth daily 90 tablet 3     mirtazapine (REMERON) 15 MG tablet TAKE 1/2 TABLET 1 HOUR BEFORE BEDTIME FOR 7 DAYS, THEN TAKE 1 TABLET 1 HOUR BEFORE BEDTIME       MULTIVITAMIN OR 1 qd       omeprazole (PRILOSEC) 20 MG CR capsule Take 1 capsule (20 mg) by mouth daily 90 capsule 1     OTC products: None, except as noted above    Allergies   Allergen Reactions     No Known Allergies       Latex Allergy: NO    Social History     Tobacco Use     Smoking status: Never Smoker     Smokeless tobacco: Never Used   Substance Use Topics     Alcohol use: Yes     Alcohol/week: 1.7 standard drinks     Types: 2 Standard drinks or equivalent per week     Comment: rarely     History   Drug Use No       REVIEW OF SYSTEMS:   Constitutional, neuro, ENT, endocrine, pulmonary, cardiac, gastrointestinal, genitourinary, musculoskeletal, integument and psychiatric systems are negative, except as otherwise noted.    EXAM:   BP (!) 140/70   Pulse 79   Temp 98  F (36.7  C)   Ht 1.715 m  "(5' 7.5\")   Wt 107 kg (236 lb)   LMP 02/26/2003   SpO2 98%   BMI 36.42 kg/m      GENERAL APPEARANCE: healthy, alert and no distress     EYES: EOMI, PERRL     HENT: ear canals and TM's normal and nose and mouth without ulcers or lesions     NECK: no adenopathy, no asymmetry, masses, or scars and thyroid normal to palpation     RESP: lungs clear to auscultation - no rales, rhonchi or wheezes     CV: regular rates and rhythm, normal S1 S2, no S3 or S4 and no murmur, click or rub     ABDOMEN:  soft, nontender, no HSM or masses and bowel sounds normal     MS: extremities normal- no gross deformities noted, no evidence of inflammation in joints, FROM in all extremities.     SKIN: no suspicious lesions or rashes     NEURO: Normal strength and tone, sensory exam grossly normal, mentation intact and speech normal     PSYCH: mentation appears normal. and affect normal/bright     LYMPHATICS: No cervical adenopathy    DIAGNOSTICS:   EKG: Not indicated due to non-vascular surgery and low risk of event (age <65 and without cardiac risk factors)  Hemoglobin (indicated for history of anemia or procedure with significant blood loss such as tonsillectomy, major intraperitoneal surgery, vascular surgery, major spine surgery, total joint replacement)      Office Visit on 10/26/2020   Component Date Value Ref Range Status     WBC 10/26/2020 7.6  4.0 - 11 10*9/L Final     ran twice ea     RBC Count 10/26/2020 5.14  3.8 - 5.2 10*12/L Final     Hemoglobin 10/26/2020 16.5* 11.7 - 15.7 g/dL Final     Hematocrit 10/26/2020 50.5* 35.0 - 47.0 % Final     MCV 10/26/2020 98.3  78 - 100 fL Final     MCH 10/26/2020 32.1  26 - 33 pg Final     MCHC 10/26/2020 32.7  31 - 36 g/dL Final     Platelet Count 10/26/2020 163  150 - 375 10^9/L Final     % Granulocytes 10/26/2020 57.9  % Final     % Lymphocytes 10/26/2020 30.8  % Final     % Monocytes 10/26/2020 11.3  % Final     IMPRESSION:   Reason for surgery/procedure: Left eye " cataract  Diagnosis/reason for consult: Pre-operative Examination    The proposed surgical procedure is considered LOW risk.    REVISED CARDIAC RISK INDEX  The patient has the following serious cardiovascular risks for perioperative complications such as (MI, PE, VFib and 3  AV Block):  No serious cardiac risks  INTERPRETATION: 1 risks: Class II (low risk - 0.9% complication rate)     The patient has the following additional risks for perioperative complications:  No identified additional risks      ICD-10-CM    1. Cataract of left eye, unspecified cataract type  H26.9 VENOUS COLLECTION     HEMOGRAM PLATELET DIFF (BFP)   2. Benign essential hypertension  I10    3. Obesity (BMI 35.0-39.9) with comorbidity (H)  E66.01    4. Gastroesophageal reflux disease, unspecified whether esophagitis present  K21.9    5. History of right knee joint replacement  Z96.651        RECOMMENDATIONS:     --Patient is to take all scheduled medications on the day of surgery EXCEPT for modifications listed below.    APPROVAL GIVEN to proceed with proposed procedure and MAC anesthesia, without further diagnostic evaluation.    1. Seven days before surgery do not take Aspirin or any over-the-counter pain medications other than Tylenol.  TYLENOL is the safest pain pill to use before surgery because it does not affect your bleeding time. If tylenol is not sufficient for pain control talk to me or the surgeon and we will decide what is safe to use.    2. Do not eat anything after midnight  (mono of the surgery) and nothing the morning of the surgery.    3. Medications: Hold medications on day of surgery until after surgery.     4. Follow all instructions given by the surgery team. They usually give out a packet. Read it and please follow it precisely. This helps surgical experience and outcomes.    5. If you have any questions do not hesitate to call me or the surgeon/surgical team.    Rosita Arriola PA-C  [facility]  Physicians      Signed Electronically by: Rosita Arriola PA-C    Copy of this evaluation report is provided to requesting physician.    [place] Preop Guidelines    Revised Cardiac Risk Index

## 2020-10-26 NOTE — PROGRESS NOTES
[facility] PHYSICIANS  [address]  [address]  [facility] [address]  [phone]  Dept: [phone]    PRE-OP EVALUATION:  Today's date: 10/26/2020    Evelyn Medrano (: 1955) presents for pre-operative evaluation assessment as requested by Dr. Wray.  She requires evaluation and anesthesia risk assessment prior to undergoing surgery/procedure for treatment of left cataract.    Proposed Surgery/ Procedure: cataract surgery  Date of Surgery/ Procedure: 11/3/2020  Time of Surgery/ Procedure:   Hospital/Surgical Facility: [facility]  Surgery Fax Number: [phone]  Primary Physician: Vazquez Alvarado  Type of Anesthesia Anticipated: to be determined    Preoperative Questionnaire:   No - Have you ever had a heart attack or stroke?  No - Have you ever had surgery on your heart or blood vessels, such as a stent, coronary (heart) bypass, or surgery on an artery in the head, neck, heart, or legs?  No - Do you have chest pain when you are physically active?  No - Do you have a history of heart failure?  No - Do you currently have a cold, bronchitis, or symptoms of other respiratory (head and chest) infections?  No - Do you have a cough, shortness of breath, or wheezing?  No - Do you or anyone in your family have a history of blood clots?  No - Do you or anyone in your family have a serious bleeding problem, such as long-lasting bleeding after surgeries or cuts?  No - Have you ever had anemia or been told to take iron pills?  No - Have you had any abnormal blood loss such as black, tarry or bloody stools, or abnormal vaginal bleeding?  No - Have you ever had a blood transfusion?  Yes - Are you willing to have a blood transfusion if it is medically needed before, during, or after your surgery?  No - Have you or anyone in your family ever had problems with anesthesia (sedation for surgery)?  No - Do you have sleep apnea, excessive snoring, or daytime drowsiness?   No - Do you  have any artifical heart valves or other implanted medical devices, such as a pacemaker, defibrillator, or continuous glucose monitor?  YES - DO YOU HAVE ANY ARTIFICIAL JOINTS? R TKR in 7/2019  No - Are you allergic to latex?  No - Is there any chance that you may be pregnant?    Patient has a Health Care Directive or Living Will:  YES    HPI:     HPI related to upcoming procedure: Evelyn has been having worsening symptoms of cataracts in left eye.     HYPERTENSION - Patient has longstanding history of HTN , currently denies any symptoms referable to elevated blood pressure. Specifically denies chest pain, palpitations, dyspnea, orthopnea, PND or peripheral edema. Blood pressure readings have been in normal range. Current medication regimen is as listed below. Patient denies any side effects of medication.     MEDICAL HISTORY:     Patient Active Problem List    Diagnosis Date Noted     Choroidal Melanoma 09/16/2002     Priority: High     Follwwed by LYDIA Medeiros at [place]    Initially treated with TTT X3, and then proton beam radiation in April 2006       History of right knee joint replacement 10/26/2020     Priority: Medium     Chest pain 03/25/2020     Priority: Medium     Arthritis of knee 07/10/2019     Priority: Medium     Obesity (BMI 35.0-39.9) with comorbidity (H) 11/17/2018     Priority: Medium     GERD (gastroesophageal reflux disease) 12/06/2014     Priority: Medium     Health Care Home 02/22/2013     Priority: Medium     State Tier Level:  Tier 0  Status:  n/a  Care Coordinator:  n/a     See Letters for [facility] Care Plan           Migraine without aura and without status migrainosus, not intractable 03/22/2000     Priority: Medium      Past Medical History:   Diagnosis Date     Choroidal Melanoma 9/16/2002    Follwwed by LYDIA Medeiros at [place]   Initially treated with TTT X3, and then proton beam radiation in April 2006     Diffuse cystic mastopathy 8/14/2001     Variants of migraine, not elsewhere classified       Past Surgical History:   Procedure Laterality Date     ARTHROSCOPY KNEE Right 2017    torn meniscus     AS TOTAL KNEE ARTHROPLASTY Right 07/29/2019     C ANESTH,SURGERY OF SHOULDER  2005    frozen shoulder     C APPENDECTOMY  1977     C DESTRUC RETINAL JACOB MARTIN  2005    [place], followed at [place], also [facility] TOTAL KNEE ARTHROPLASTY Left 2010    meniscus tear     COLONOSCOPY  12/2011    Normal repeat in 5 years     COLONOSCOPY  02/2013    internal hemorrhoids; repeat in 10 years     DESTRUC RETINAL LESN,PHOTOCOAG  2003    right eye/ melanoma treatment     HC TOOTH EXTRACTION W/FORCEP  age 25     TONSILLECTOMY  1962     UPPER GI ENDOSCOPY  12/2011    normal     Current Outpatient Medications   Medication Sig Dispense Refill     IMITREX 6 MG/0.5ML SC SOLN Inject 6 mg Subcutaneous every 12 hours as needed Once at onset of headache, then can repeat x1 for total of 2 doses in 24 hrs       losartan (COZAAR) 50 MG tablet Take 1 tablet (50 mg) by mouth daily 90 tablet 3     mirtazapine (REMERON) 15 MG tablet TAKE 1/2 TABLET 1 HOUR BEFORE BEDTIME FOR 7 DAYS, THEN TAKE 1 TABLET 1 HOUR BEFORE BEDTIME       MULTIVITAMIN OR 1 qd       omeprazole (PRILOSEC) 20 MG CR capsule Take 1 capsule (20 mg) by mouth daily 90 capsule 1     OTC products: None, except as noted above    Allergies   Allergen Reactions     No Known Allergies       Latex Allergy: NO    Social History     Tobacco Use     Smoking status: Never Smoker     Smokeless tobacco: Never Used   Substance Use Topics     Alcohol use: Yes     Alcohol/week: 1.7 standard drinks     Types: 2 Standard drinks or equivalent per week     Comment: rarely     History   Drug Use No       REVIEW OF SYSTEMS:   Constitutional, neuro, ENT, endocrine, pulmonary, cardiac, gastrointestinal, genitourinary, musculoskeletal, integument and psychiatric systems are negative, except as otherwise noted.    EXAM:   BP (!) 140/70   Pulse 79   Temp 98  F (36.7  C)   Ht 1.715 m  "(5' 7.5\")   Wt 107 kg (236 lb)   LMP 02/26/2003   SpO2 98%   BMI 36.42 kg/m      GENERAL APPEARANCE: healthy, alert and no distress     EYES: EOMI, PERRL     HENT: ear canals and TM's normal and nose and mouth without ulcers or lesions     NECK: no adenopathy, no asymmetry, masses, or scars and thyroid normal to palpation     RESP: lungs clear to auscultation - no rales, rhonchi or wheezes     CV: regular rates and rhythm, normal S1 S2, no S3 or S4 and no murmur, click or rub     ABDOMEN:  soft, nontender, no HSM or masses and bowel sounds normal     MS: extremities normal- no gross deformities noted, no evidence of inflammation in joints, FROM in all extremities.     SKIN: no suspicious lesions or rashes     NEURO: Normal strength and tone, sensory exam grossly normal, mentation intact and speech normal     PSYCH: mentation appears normal. and affect normal/bright     LYMPHATICS: No cervical adenopathy    DIAGNOSTICS:   EKG: Not indicated due to non-vascular surgery and low risk of event (age <65 and without cardiac risk factors)  Hemoglobin (indicated for history of anemia or procedure with significant blood loss such as tonsillectomy, major intraperitoneal surgery, vascular surgery, major spine surgery, total joint replacement)      Office Visit on 10/26/2020   Component Date Value Ref Range Status     WBC 10/26/2020 7.6  4.0 - 11 10*9/L Final     ran twice ea     RBC Count 10/26/2020 5.14  3.8 - 5.2 10*12/L Final     Hemoglobin 10/26/2020 16.5* 11.7 - 15.7 g/dL Final     Hematocrit 10/26/2020 50.5* 35.0 - 47.0 % Final     MCV 10/26/2020 98.3  78 - 100 fL Final     MCH 10/26/2020 32.1  26 - 33 pg Final     MCHC 10/26/2020 32.7  31 - 36 g/dL Final     Platelet Count 10/26/2020 163  150 - 375 10^9/L Final     % Granulocytes 10/26/2020 57.9  % Final     % Lymphocytes 10/26/2020 30.8  % Final     % Monocytes 10/26/2020 11.3  % Final     IMPRESSION:   Reason for surgery/procedure: Left eye " cataract  Diagnosis/reason for consult: Pre-operative Examination    The proposed surgical procedure is considered LOW risk.    REVISED CARDIAC RISK INDEX  The patient has the following serious cardiovascular risks for perioperative complications such as (MI, PE, VFib and 3  AV Block):  No serious cardiac risks  INTERPRETATION: 1 risks: Class II (low risk - 0.9% complication rate)     The patient has the following additional risks for perioperative complications:  No identified additional risks      ICD-10-CM    1. Cataract of left eye, unspecified cataract type  H26.9 VENOUS COLLECTION     HEMOGRAM PLATELET DIFF (BFP)   2. Benign essential hypertension  I10    3. Obesity (BMI 35.0-39.9) with comorbidity (H)  E66.01    4. Gastroesophageal reflux disease, unspecified whether esophagitis present  K21.9    5. History of right knee joint replacement  Z96.651        RECOMMENDATIONS:     --Patient is to take all scheduled medications on the day of surgery EXCEPT for modifications listed below.    APPROVAL GIVEN to proceed with proposed procedure and MAC anesthesia, without further diagnostic evaluation.    1. Seven days before surgery do not take Aspirin or any over-the-counter pain medications other than Tylenol.  TYLENOL is the safest pain pill to use before surgery because it does not affect your bleeding time. If tylenol is not sufficient for pain control talk to me or the surgeon and we will decide what is safe to use.    2. Do not eat anything after midnight  (mono of the surgery) and nothing the morning of the surgery.    3. Medications: Hold medications on day of surgery until after surgery.     4. Follow all instructions given by the surgery team. They usually give out a packet. Read it and please follow it precisely. This helps surgical experience and outcomes.    5. If you have any questions do not hesitate to call me or the surgeon/surgical team.    Rosita Arriola PA-C  [facility]  Physicians      Signed Electronically by: Rosita Arriola PA-C    Copy of this evaluation report is provided to requesting physician.    [place] Preop Guidelines    Revised Cardiac Risk Index

## 2021-01-09 ENCOUNTER — HEALTH MAINTENANCE LETTER (OUTPATIENT)
Age: 66
End: 2021-01-09

## 2021-10-02 DIAGNOSIS — I10 BENIGN ESSENTIAL HYPERTENSION: ICD-10-CM

## 2021-10-04 RX ORDER — LOSARTAN POTASSIUM 50 MG/1
TABLET ORAL
Qty: 90 TABLET | Refills: 3 | COMMUNITY
Start: 2021-10-04

## 2021-10-04 NOTE — TELEPHONE ENCOUNTER
Evelyn Medrano is requesting a refill of:    Refused Prescriptions:                       Disp   Refills    losartan (COZAAR) 50 MG tablet [Pharmacy M*90 tab*3        Sig: TAKE 1 TABLET BY MOUTH EVERY DAY  Refused By: ADRIANNE ALEX  Reason for Refusal: Patient needs appointment    Needs non fasting OV for refills

## 2021-10-04 NOTE — TELEPHONE ENCOUNTER
Patient called in and left a message on the CS line asking if Dr. Alvarado will approve one more 90 day supply of  Pending Prescriptions:                       Disp   Refills    losartan (COZAAR) 50 MG tablet            90 tab*3            Sig: Take 1 tablet (50 mg) by mouth daily  Refused Prescriptions:                       Disp   Refills    losartan (COZAAR) 50 MG tablet [Pharmacy M*90 tab*3        Sig: TAKE 1 TABLET BY MOUTH EVERY DAY  Refused By: ADRIANNE ALEX  Reason for Refusal: Patient needs appointment    She will not have insurance until the end of November or early December due to she and her  both starting new jobs. She stated that she will be in before the 90 day supply runs out but is hoping that Dr. Alvarado will do this for her.  Routing to Dr. Alvarado for approval.

## 2021-10-05 RX ORDER — LOSARTAN POTASSIUM 50 MG/1
50 TABLET ORAL DAILY
Qty: 90 TABLET | Refills: 0 | Status: SHIPPED | OUTPATIENT
Start: 2021-10-05 | End: 2022-02-02

## 2021-10-23 ENCOUNTER — HEALTH MAINTENANCE LETTER (OUTPATIENT)
Age: 66
End: 2021-10-23

## 2022-01-04 DIAGNOSIS — I10 BENIGN ESSENTIAL HYPERTENSION: ICD-10-CM

## 2022-01-04 RX ORDER — LOSARTAN POTASSIUM 50 MG/1
TABLET ORAL
Qty: 90 TABLET | Refills: 0 | COMMUNITY
Start: 2022-01-04

## 2022-01-04 NOTE — TELEPHONE ENCOUNTER
Evelyn Medrano is requesting a refill of:    Refused Prescriptions:                       Disp   Refills    losartan (COZAAR) 50 MG tablet [Pharmacy M*90 tab*0        Sig: TAKE 1 TABLET BY MOUTH EVERY DAY  Refused By: TATY GAMBLE  Reason for Refusal: Patient needs appointment    Last med recheck 09/21/20, due for yearly OV

## 2022-02-02 RX ORDER — LOSARTAN POTASSIUM 50 MG/1
50 TABLET ORAL DAILY
Qty: 14 TABLET | Refills: 0 | Status: SHIPPED | OUTPATIENT
Start: 2022-02-02

## 2022-02-02 NOTE — TELEPHONE ENCOUNTER
Pt called asking for short extension of her Lisinopril 50MG. She has cancelled 2 appointments already. Please advise if you are willing to send in short supply?    Evelyn Medrano is requesting a refill of:    Pending Prescriptions:                       Disp   Refills    losartan (COZAAR) 50 MG tablet            14 tab*0            Sig: Take 1 tablet (50 mg) by mouth daily  Refused Prescriptions:                       Disp   Refills    losartan (COZAAR) 50 MG tablet [Pharmacy M*90 tab*0        Sig: TAKE 1 TABLET BY MOUTH EVERY DAY  Refused By: TATY GAMBLE  Reason for Refusal: Patient needs appointment

## 2022-02-12 ENCOUNTER — HEALTH MAINTENANCE LETTER (OUTPATIENT)
Age: 67
End: 2022-02-12

## 2022-02-15 ENCOUNTER — APPOINTMENT (OUTPATIENT)
Dept: URBAN - METROPOLITAN AREA CLINIC 253 | Age: 67
Setting detail: DERMATOLOGY
End: 2022-02-15

## 2022-02-15 VITALS — HEIGHT: 68 IN | WEIGHT: 210 LBS | RESPIRATION RATE: 14 BRPM

## 2022-02-15 DIAGNOSIS — L57.0 ACTINIC KERATOSIS: ICD-10-CM

## 2022-02-15 PROCEDURE — OTHER COUNSELING: OTHER

## 2022-02-15 PROCEDURE — 99203 OFFICE O/P NEW LOW 30 MIN: CPT

## 2022-02-15 PROCEDURE — OTHER MIPS QUALITY: OTHER

## 2022-02-15 PROCEDURE — OTHER EDUCATIONAL RESOURCES PROVIDED: OTHER

## 2022-02-15 PROCEDURE — OTHER PRESCRIPTION: OTHER

## 2022-02-15 PROCEDURE — OTHER ADDITIONAL NOTES: OTHER

## 2022-02-15 RX ORDER — FLUOROURACIL 5 MG/G
5% CREAM TOPICAL BID
Qty: 40 | Refills: 0 | Status: ERX

## 2022-02-15 ASSESSMENT — LOCATION SIMPLE DESCRIPTION DERM: LOCATION SIMPLE: NOSE

## 2022-02-15 ASSESSMENT — LOCATION DETAILED DESCRIPTION DERM: LOCATION DETAILED: NASAL DORSUM

## 2022-02-15 ASSESSMENT — LOCATION ZONE DERM: LOCATION ZONE: NOSE

## 2022-02-15 NOTE — HPI: EVALUATION OF SKIN LESION(S)
What Type Of Note Output Would You Prefer (Optional)?: Standard Output
Have Your Spot(S) Been Treated In The Past?: has not been treated
Hpi Title: Evaluation of a Skin Lesion
Additional History: Spot on nose she is concerned about. Her  has been in the hospital the last couple weeks. She thought it was a pimple, but it seems like it’s getting bigger and redder. She does have a history of melanoma near her retina.

## 2022-02-15 NOTE — PROCEDURE: ADDITIONAL NOTES
Render Risk Assessment In Note?: no
Additional Notes: Recommended FBE \\nA clinical assistant was present for the exam. Care instructions were explained to the patient in detail. Told patient to call with any concerns or questions. The patient verbalized understanding and agreement of the education provided and the treatment plan. Encouraged patient to schedule a follow up appointment right after visit. At the end of the visit, all questions had been answered and the patient was satisfied with the visit.
Detail Level: Simple

## 2022-02-15 NOTE — PROCEDURE: COUNSELING
Detail Level: Detailed
Patient Specific Counseling (Will Not Stick From Patient To Patient): Discussed treating topically vs with LN2.

## 2022-03-04 ENCOUNTER — APPOINTMENT (OUTPATIENT)
Dept: URBAN - METROPOLITAN AREA CLINIC 253 | Age: 67
Setting detail: DERMATOLOGY
End: 2022-03-04

## 2022-03-04 DIAGNOSIS — L24.4 IRRITANT CONTACT DERMATITIS DUE TO DRUGS IN CONTACT WITH SKIN: ICD-10-CM

## 2022-03-04 DIAGNOSIS — Z09 ENCOUNTER FOR FOLLOW-UP EXAMINATION AFTER COMPLETED TREATMENT FOR CONDITIONS OTHER THAN MALIGNANT NEOPLASM: ICD-10-CM

## 2022-03-04 PROCEDURE — OTHER ADDITIONAL NOTES: OTHER

## 2022-03-04 PROCEDURE — OTHER PATIENT SPECIFIC COUNSELING: OTHER

## 2022-03-04 PROCEDURE — OTHER MIPS QUALITY: OTHER

## 2022-03-04 PROCEDURE — OTHER COUNSELING: OTHER

## 2022-03-04 PROCEDURE — 99212 OFFICE O/P EST SF 10 MIN: CPT

## 2022-03-04 ASSESSMENT — LOCATION DETAILED DESCRIPTION DERM
LOCATION DETAILED: LEFT MEDIAL MALAR CHEEK
LOCATION DETAILED: NASAL DORSUM

## 2022-03-04 ASSESSMENT — LOCATION SIMPLE DESCRIPTION DERM
LOCATION SIMPLE: NOSE
LOCATION SIMPLE: LEFT CHEEK

## 2022-03-04 ASSESSMENT — LOCATION ZONE DERM
LOCATION ZONE: NOSE
LOCATION ZONE: FACE

## 2022-03-04 NOTE — PROCEDURE: ADDITIONAL NOTES
Additional Notes: Avoid products which may be going into your eye.\\n\\nA clinical assistant was present for the exam. Care instructions were explained to the patient in detail. Told patient to call with any concerns or questions. The patient verbalized understanding and agreement of the education provided and the treatment plan. Encouraged patient to schedule a follow up appointment right after visit. At the end of the visit, all questions had been answered and the patient was satisfied with the visit.
Render Risk Assessment In Note?: no
Detail Level: Simple

## 2022-03-04 NOTE — PROCEDURE: PATIENT SPECIFIC COUNSELING
Continue an additional 5 days and follow up in a month. Be sure to avoid eye area
Detail Level: Zone

## 2022-03-04 NOTE — HPI: SKIN LESION (ACTINIC KERATOSES)
Is This A New Presentation, Or A Follow-Up?: History of Actinic Keratoses
Additional History: She has been using the 5fu for two weeks twice daily. She is having a substantial reaction. It is a little tender under her eye, nose and cheeks are okay.

## 2022-05-03 ENCOUNTER — APPOINTMENT (OUTPATIENT)
Dept: URBAN - METROPOLITAN AREA CLINIC 253 | Age: 67
Setting detail: DERMATOLOGY
End: 2022-05-03

## 2022-05-03 VITALS — WEIGHT: 210 LBS | HEIGHT: 68 IN | RESPIRATION RATE: 14 BRPM

## 2022-05-03 DIAGNOSIS — Z85.820 PERSONAL HISTORY OF MALIGNANT MELANOMA OF SKIN: ICD-10-CM

## 2022-05-03 DIAGNOSIS — L81.4 OTHER MELANIN HYPERPIGMENTATION: ICD-10-CM

## 2022-05-03 DIAGNOSIS — D18.0 HEMANGIOMA: ICD-10-CM

## 2022-05-03 DIAGNOSIS — Z71.89 OTHER SPECIFIED COUNSELING: ICD-10-CM

## 2022-05-03 DIAGNOSIS — L82.1 OTHER SEBORRHEIC KERATOSIS: ICD-10-CM

## 2022-05-03 DIAGNOSIS — L57.0 ACTINIC KERATOSIS: ICD-10-CM

## 2022-05-03 DIAGNOSIS — D22 MELANOCYTIC NEVI: ICD-10-CM

## 2022-05-03 PROBLEM — D22.5 MELANOCYTIC NEVI OF TRUNK: Status: ACTIVE | Noted: 2022-05-03

## 2022-05-03 PROBLEM — D23.72 OTHER BENIGN NEOPLASM OF SKIN OF LEFT LOWER LIMB, INCLUDING HIP: Status: ACTIVE | Noted: 2022-05-03

## 2022-05-03 PROBLEM — D18.01 HEMANGIOMA OF SKIN AND SUBCUTANEOUS TISSUE: Status: ACTIVE | Noted: 2022-05-03

## 2022-05-03 PROBLEM — D23.61 OTHER BENIGN NEOPLASM OF SKIN OF RIGHT UPPER LIMB, INCLUDING SHOULDER: Status: ACTIVE | Noted: 2022-05-03

## 2022-05-03 PROBLEM — D48.5 NEOPLASM OF UNCERTAIN BEHAVIOR OF SKIN: Status: ACTIVE | Noted: 2022-05-03

## 2022-05-03 PROCEDURE — 99213 OFFICE O/P EST LOW 20 MIN: CPT | Mod: 25

## 2022-05-03 PROCEDURE — 11102 TANGNTL BX SKIN SINGLE LES: CPT

## 2022-05-03 PROCEDURE — OTHER MIPS QUALITY: OTHER

## 2022-05-03 PROCEDURE — OTHER COUNSELING: OTHER

## 2022-05-03 PROCEDURE — OTHER BIOPSY BY SHAVE METHOD: OTHER

## 2022-05-03 PROCEDURE — OTHER ADDITIONAL NOTES: OTHER

## 2022-05-03 ASSESSMENT — LOCATION SIMPLE DESCRIPTION DERM
LOCATION SIMPLE: RIGHT EYE
LOCATION SIMPLE: LEFT CHEEK
LOCATION SIMPLE: RIGHT LOWER BACK
LOCATION SIMPLE: UPPER BACK

## 2022-05-03 ASSESSMENT — LOCATION DETAILED DESCRIPTION DERM
LOCATION DETAILED: RIGHT PUPIL
LOCATION DETAILED: LEFT INFERIOR CENTRAL MALAR CHEEK
LOCATION DETAILED: RIGHT SUPERIOR MEDIAL LOWER BACK
LOCATION DETAILED: INFERIOR THORACIC SPINE

## 2022-05-03 ASSESSMENT — LOCATION ZONE DERM
LOCATION ZONE: FACE
LOCATION ZONE: TRUNK
LOCATION ZONE: CORNEA

## 2022-05-03 NOTE — HPI: FULL BODY SKIN EXAMINATION
What Type Of Note Output Would You Prefer (Optional)?: Standard Output
What Is The Reason For Today's Visit?: Full Body Skin Examination
What Is The Reason For Today's Visit? (Being Monitored For X): the re-examination of lesions previously examined
Additional History: FBE. No concerns today except would like to check in on her 5fu use.

## 2022-05-03 NOTE — PROCEDURE: BIOPSY BY SHAVE METHOD
Billing Type: Third-Party Bill
Electrodesiccation Text: The wound bed was treated with electrodesiccation after the biopsy was performed.
Notification Instructions: Patient will be notified of biopsy results. However, patient instructed to call the office if not contacted within 2 weeks.
Validate That Anesthesia Is Not 0: No
Dressing: bandage
Information: Selecting Yes will display possible errors in your note based on the variables you have selected. This validation is only offered as a suggestion for you. PLEASE NOTE THAT THE VALIDATION TEXT WILL BE REMOVED WHEN YOU FINALIZE YOUR NOTE. IF YOU WANT TO FAX A PRELIMINARY NOTE YOU WILL NEED TO TOGGLE THIS TO 'NO' IF YOU DO NOT WANT IT IN YOUR FAXED NOTE.
Consent: Written consent was obtained and risks were reviewed including but not limited to scarring, infection, bleeding, scabbing, incomplete removal, nerve damage and allergy to anesthesia.
X Size Of Lesion In Cm: 0
Post-Care Instructions: I reviewed with the patient in detail post-care instructions. Patient is to keep the biopsy site dry overnight, and then apply bacitracin twice daily until healed. Patient may apply hydrogen peroxide soaks to remove any crusting.
Wound Care: Petrolatum
Render Post-Care Instructions In Note?: yes
Detail Level: Detailed
Anesthesia Volume In Cc (Will Not Render If 0): 0.3
Biopsy Method: Dermablade
Biopsy Type: H and E
Hemostasis: Drysol
Type Of Destruction Used: Curettage
Depth Of Biopsy: dermis
Electrodesiccation And Curettage Text: The wound bed was treated with electrodesiccation and curettage after the biopsy was performed.
Curettage Text: The wound bed was treated with curettage after the biopsy was performed.
Silver Nitrate Text: The wound bed was treated with silver nitrate after the biopsy was performed.
Anesthesia Type: 2% Xylocaine with epinephrine and sodium bicarbonate
Cryotherapy Text: The wound bed was treated with cryotherapy after the biopsy was performed.

## 2022-05-03 NOTE — PROCEDURE: ADDITIONAL NOTES
Render Risk Assessment In Note?: no
Additional Notes: The back of the retina on her right eye. She states her symptoms was blurry vision. It was treated 15 years ago.
Detail Level: Simple
Additional Notes: A clinical assistant was present for the exam. Care instructions were explained to the patient in detail. Told patient to call with any concerns or questions. The patient verbalized understanding and agreement of the education provided and the treatment plan. Encouraged patient to schedule a follow up appointment right after visit. At the end of the visit, all questions had been answered and the patient was satisfied with the visit.

## 2022-05-03 NOTE — PROCEDURE: COUNSELING
Detail Level: Generalized
Detail Level: Detailed
Patient Specific Counseling (Will Not Stick From Patient To Patient): Informed her that the treatment worked well even with her bad response to the 5 FU.
Detail Level: Zone
Detail Level: Simple

## 2022-09-04 NOTE — TELEPHONE ENCOUNTER
Evelyn Medrano is requesting a refill of:    Signed Prescriptions:                        Disp   Refills    losartan (COZAAR) 50 MG tablet             30 tab*0        Sig: Take 1 tablet (50 mg) by mouth daily  Authorizing Provider: ANA BALTAZAR  Ordering User: ADRIANNE ALEX       55.3

## 2022-10-09 ENCOUNTER — HEALTH MAINTENANCE LETTER (OUTPATIENT)
Age: 67
End: 2022-10-09

## 2022-11-26 ENCOUNTER — HEALTH MAINTENANCE LETTER (OUTPATIENT)
Age: 67
End: 2022-11-26

## 2023-07-10 ENCOUNTER — TRANSFERRED RECORDS (OUTPATIENT)
Dept: HEALTH INFORMATION MANAGEMENT | Facility: CLINIC | Age: 68
End: 2023-07-10

## 2023-07-17 ENCOUNTER — TRANSFERRED RECORDS (OUTPATIENT)
Dept: HEALTH INFORMATION MANAGEMENT | Facility: CLINIC | Age: 68
End: 2023-07-17

## 2023-08-15 ENCOUNTER — MEDICAL CORRESPONDENCE (OUTPATIENT)
Dept: HEALTH INFORMATION MANAGEMENT | Facility: CLINIC | Age: 68
End: 2023-08-15
Payer: COMMERCIAL

## 2023-08-16 ENCOUNTER — TRANSCRIBE ORDERS (OUTPATIENT)
Dept: OTHER | Age: 68
End: 2023-08-16

## 2023-08-16 DIAGNOSIS — H31.011 MACULA SCARS OF POSTERIOR POLE (POSTINFLAMMATORY) (POST-TRAUMATIC), RIGHT EYE: Primary | ICD-10-CM

## 2023-08-30 DIAGNOSIS — D31.32 CHOROIDAL NEVUS OF LEFT EYE: Primary | ICD-10-CM

## 2023-09-12 ENCOUNTER — OFFICE VISIT (OUTPATIENT)
Dept: OPHTHALMOLOGY | Facility: CLINIC | Age: 68
End: 2023-09-12
Attending: OPTOMETRIST
Payer: COMMERCIAL

## 2023-09-12 DIAGNOSIS — H31.011 MACULA SCARS OF POSTERIOR POLE (POSTINFLAMMATORY) (POST-TRAUMATIC), RIGHT EYE: ICD-10-CM

## 2023-09-12 DIAGNOSIS — D31.32 CHOROIDAL NEVUS OF LEFT EYE: ICD-10-CM

## 2023-09-12 DIAGNOSIS — H40.051 BORDERLINE GLAUCOMA OF RIGHT EYE WITH OCULAR HYPERTENSION: ICD-10-CM

## 2023-09-12 DIAGNOSIS — C69.32 CHOROIDAL MALIGNANT MELANOMA, LEFT (H): Primary | ICD-10-CM

## 2023-09-12 PROCEDURE — 76512 OPH US DX B-SCAN: CPT | Performed by: OPHTHALMOLOGY

## 2023-09-12 PROCEDURE — 99204 OFFICE O/P NEW MOD 45 MIN: CPT | Performed by: OPHTHALMOLOGY

## 2023-09-12 PROCEDURE — 99207 FUNDUS PHOTOS OU (BOTH EYES): CPT | Mod: 26 | Performed by: OPHTHALMOLOGY

## 2023-09-12 PROCEDURE — G0463 HOSPITAL OUTPT CLINIC VISIT: HCPCS | Performed by: OPHTHALMOLOGY

## 2023-09-12 PROCEDURE — 92134 CPTRZ OPH DX IMG PST SGM RTA: CPT | Performed by: OPHTHALMOLOGY

## 2023-09-12 PROCEDURE — 92020 GONIOSCOPY: CPT | Performed by: OPHTHALMOLOGY

## 2023-09-12 PROCEDURE — 92250 FUNDUS PHOTOGRAPHY W/I&R: CPT | Performed by: OPHTHALMOLOGY

## 2023-09-12 RX ORDER — PREDNISOLONE ACETATE 10 MG/ML
1 SUSPENSION/ DROPS OPHTHALMIC
COMMUNITY
Start: 2023-07-30

## 2023-09-12 RX ORDER — OXYBUTYNIN CHLORIDE 5 MG/1
5 TABLET, EXTENDED RELEASE ORAL EVERY MORNING
COMMUNITY
Start: 2023-08-09

## 2023-09-12 ASSESSMENT — CONF VISUAL FIELD
OD_SUPERIOR_NASAL_RESTRICTION: 1
OS_SUPERIOR_NASAL_RESTRICTION: 0
OS_SUPERIOR_TEMPORAL_RESTRICTION: 0
OD_SUPERIOR_TEMPORAL_RESTRICTION: 1
METHOD: COUNTING FINGERS
OD_INFERIOR_NASAL_RESTRICTION: 1
OD_INFERIOR_TEMPORAL_RESTRICTION: 1
OS_NORMAL: 1
OS_INFERIOR_NASAL_RESTRICTION: 0
OS_INFERIOR_TEMPORAL_RESTRICTION: 0

## 2023-09-12 ASSESSMENT — VISUAL ACUITY
OD_CC: HM
METHOD: SNELLEN - LINEAR
OS_CC: 20/20

## 2023-09-12 ASSESSMENT — TONOMETRY
OD_IOP_MMHG: 25
OS_IOP_MMHG: 17
IOP_METHOD: ICARE

## 2023-09-12 ASSESSMENT — EXTERNAL EXAM - LEFT EYE: OS_EXAM: NORMAL

## 2023-09-12 ASSESSMENT — CUP TO DISC RATIO: OS_RATIO: 0.3

## 2023-09-12 ASSESSMENT — EXTERNAL EXAM - RIGHT EYE: OD_EXAM: NORMAL

## 2023-09-12 ASSESSMENT — SLIT LAMP EXAM - LIDS
COMMENTS: NORMAL
COMMENTS: NORMAL

## 2023-09-12 NOTE — PROGRESS NOTES
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 Had been seen at [place] in past last visit ~ 2020, no h/o injections    ?iritis noted OD, using PF 1-2/week  h/o epilation OD at [facility]      PAST OCULAR SURGERY  TTT OD x 2  2003 ([facility])  PBR OD 2003 ([facility])  CE/IOL OD  ~2010  CE/IOL OS ~ 2021      RETINAL IMAGING:  OCT 09/12/23   OD - severe atrophy, SR scarring, SRF focal  OS - normal retina, PHF attached      U/S OD  A-scan - too small for A-scan  B-scan 0.90 mm x 7.65T x 6.40 L      ASSESSMENT & PLAN    # CMM OD   - s/p PBR & TTT   - small remnant today   - U/S every few years      # Radiation retinopathy OD   - no NV visible, no NVI or NVG   - poor vision   - monitor      # OAG OD   - no NV   - h/o iritis per patient   - monitor for now        # PVD OU   - advised S/Sx RD 9/2023      # TALYA   - ATs d/w patient today      # ?iritis OD   - no inflammation today   - unclear if needs PF, has pain intermittent but may be TALYA          # Prominent conj vessels OD      Return in about 6 months (around 3/12/2024) for Tech Instructions:, DFE OD, OCT OU, OCT 55 deree, [employer].     ATTESTATION     Attending Attestation:     Complete documentation of historical and exam elements from today's encounter can be found in the full encounter summary report (not reduplicated in this progress note).  I personally obtained the chief complaint(s) and history of present illness.  I confirmed and edited as necessary the review of systems, past medical/surgical history, family history, social history, and examination findings as documented by others; and I examined the patient myself.  I  personally reviewed the relevant tests, images, and reports as documented above.  I formulated and edited as necessary the assessment and plan and discussed the findings and management plan with the patient and family    Payal Lester MD, PhD  , Vitreoretinal Surgery  Department of Ophthalmology  [facility]

## 2023-09-12 NOTE — NURSING NOTE
Chief Complaints and History of Present Illnesses   Patient presents with    Retinal Evaluation     Chief Complaint(s) and History of Present Illness(es)       Retinal Evaluation              Laterality: both eyes    Course: stable    Associated symptoms: eye pain (intermittent), flashes (intermittent) and floaters (intermittent)    Treatments tried: eye drops              Comments    Here for retinal evaluation. [facility] is doing fine. Some pain that improves with PF as needed from qday. Intermittent flashes and floaters.    Praedep MATHEW 9:06 AM September 12, 2023

## 2023-10-28 ENCOUNTER — HEALTH MAINTENANCE LETTER (OUTPATIENT)
Age: 68
End: 2023-10-28

## 2024-01-06 ENCOUNTER — HEALTH MAINTENANCE LETTER (OUTPATIENT)
Age: 69
End: 2024-01-06

## 2024-03-08 DIAGNOSIS — C69.32 CHOROIDAL MALIGNANT MELANOMA, LEFT (H): Primary | ICD-10-CM

## 2024-03-19 ENCOUNTER — OFFICE VISIT (OUTPATIENT)
Dept: OPHTHALMOLOGY | Facility: CLINIC | Age: 69
End: 2024-03-19
Attending: OPHTHALMOLOGY
Payer: COMMERCIAL

## 2024-03-19 DIAGNOSIS — C69.32 CHOROIDAL MALIGNANT MELANOMA, LEFT (H): ICD-10-CM

## 2024-03-19 DIAGNOSIS — H31.011 MACULA SCARS OF POSTERIOR POLE (POSTINFLAMMATORY) (POST-TRAUMATIC), RIGHT EYE: Primary | ICD-10-CM

## 2024-03-19 PROCEDURE — 99213 OFFICE O/P EST LOW 20 MIN: CPT | Performed by: OPHTHALMOLOGY

## 2024-03-19 PROCEDURE — 92134 CPTRZ OPH DX IMG PST SGM RTA: CPT | Performed by: OPHTHALMOLOGY

## 2024-03-19 PROCEDURE — 99207 FUNDUS PHOTOS OU (BOTH EYES): CPT | Mod: 26 | Performed by: OPHTHALMOLOGY

## 2024-03-19 PROCEDURE — 92250 FUNDUS PHOTOGRAPHY W/I&R: CPT | Mod: 59 | Performed by: OPHTHALMOLOGY

## 2024-03-19 PROCEDURE — G0463 HOSPITAL OUTPT CLINIC VISIT: HCPCS | Performed by: OPHTHALMOLOGY

## 2024-03-19 ASSESSMENT — CONF VISUAL FIELD
OS_INFERIOR_NASAL_RESTRICTION: 0
OD_SUPERIOR_NASAL_RESTRICTION: 1
OS_SUPERIOR_TEMPORAL_RESTRICTION: 0
OD_INFERIOR_TEMPORAL_RESTRICTION: 1
OS_NORMAL: 1
OS_SUPERIOR_NASAL_RESTRICTION: 0
METHOD: COUNTING FINGERS
OD_SUPERIOR_TEMPORAL_RESTRICTION: 1
OS_INFERIOR_TEMPORAL_RESTRICTION: 0
OD_INFERIOR_NASAL_RESTRICTION: 1

## 2024-03-19 ASSESSMENT — EXTERNAL EXAM - RIGHT EYE: OD_EXAM: NORMAL

## 2024-03-19 ASSESSMENT — SLIT LAMP EXAM - LIDS
COMMENTS: NORMAL
COMMENTS: NORMAL, NO TRICHIASIS

## 2024-03-19 ASSESSMENT — TONOMETRY
OD_IOP_MMHG: 20
OS_IOP_MMHG: 14
IOP_METHOD: TONOPEN

## 2024-03-19 ASSESSMENT — VISUAL ACUITY
CORRECTION_TYPE: GLASSES
OD_CC: HM
OS_CC: 20/20
METHOD: SNELLEN - LINEAR

## 2024-03-19 ASSESSMENT — EXTERNAL EXAM - LEFT EYE: OS_EXAM: NORMAL

## 2024-03-19 NOTE — PROGRESS NOTES
CC -   CMM OD    INTERVAL HISTORY - No changes, last used PF ~ 1 week ago, uses ATs  ~ 1-2/week, has burning/FBS    No DM    PMH -   Evelyn L Mitchell is a  68 year old year-old patient with history of CMM OD Tx with PBR & TTT in 2003  vision OD poor since Tx  Was seen by [facility]  ~ 6/2023, told had radiation retinopathy and needed to be seen by [place] came to [facility]. Had been seen at [place] in past last visit ~ 2020, no h/o injections    ?iritis noted OD, using PF 1-2/week  h/o epilation OD at [facility]      PAST OCULAR SURGERY  TTT OD x 2  2003 ([facility])  PBR OD 2003 ([facility])  CE/IOL OD  ~2010  CE/IOL OS ~ 2021      RETINAL IMAGING:  OCT 03/19/24   OD - severe atrophy, SR scarring, SRF focal  OS - normal retina, PHF attached      U/S OD  A-scan - too small for A-scan  B-scan 0.90 mm x 7.65T x 6.40 L      ASSESSMENT & PLAN    # CMM OD   - s/p PBR & TTT   - last U/S 9/2023   - small remnant today   - U/S every few years      # Radiation retinopathy OD   - no NV visible, no NVI or NVG   - prominent old HEx and large DBH, focal SRF    - poor vision   - monitor      # OAG OD   - IOP 25 on 9/2023   - no NV   - h/o iritis per patient   - monitor for now        # PVD OD, syneresis OS   - advised S/Sx RD 9/2023      # TALYA   - ATs d/w patient today      # ?iritis OD   - no inflammation today   - unclear if needs PF, has pain intermittent but may be TALYA   - advised hold PF and try ATs regularly (3/2024          # Prominent conj vessels OD   - consider [facility] in future    # ?choroidal nevus OS   - small flat pig nevus on Optos note d3/2024          Return in about 6 months (around 9/19/2024) for DFE OU, OCT OU, [employer].     ATTESTATION     Attending Attestation:     Complete documentation of historical and exam elements from today's encounter can be found in the full encounter summary report (not reduplicated in this progress note).  I personally obtained the chief complaint(s) and history of present illness.  I confirmed and edited as  necessary the review of systems, past medical/surgical history, family history, social history, and examination findings as documented by others; and I examined the patient myself.  I personally reviewed the relevant tests, images, and reports as documented above.  I formulated and edited as necessary the assessment and plan and discussed the findings and management plan with the patient and family    Payal Lester MD, PhD  , Vitreoretinal Surgery  Department of Ophthalmology  [facility]

## 2024-03-19 NOTE — NURSING NOTE
Chief Complaints and History of Present Illnesses   Patient presents with    Follow Up     Follow up CMM Right eye.     Chief Complaint(s) and History of Present Illness(es)       Follow Up              Laterality: both eyes    Associated symptoms: photophobia.  Negative for flashes    Treatments tried: eye drops and artificial tears    Pain scale: 0/10    Comments: Follow up CMM Right eye.              Comments    The patient reports she has a new pair of eyeglasses on order.  She had a refraction last week.  The patient has intermittent gritty, dry, achy right eye and she uses Prednisolone as needed.  She reports light sensitivity at times.  Patient was recommended to use lid hygiene by her Optometrist.   Cheyenne Jordan, COA, COA 10:01 AM 03/19/2024

## 2025-01-25 ENCOUNTER — HEALTH MAINTENANCE LETTER (OUTPATIENT)
Age: 70
End: 2025-01-25

## 2025-01-28 ENCOUNTER — PATIENT OUTREACH (OUTPATIENT)
Dept: CARE COORDINATION | Facility: CLINIC | Age: 70
End: 2025-01-28
Payer: COMMERCIAL

## 2025-01-30 ENCOUNTER — PATIENT OUTREACH (OUTPATIENT)
Dept: CARE COORDINATION | Facility: CLINIC | Age: 70
End: 2025-01-30
Payer: COMMERCIAL